# Patient Record
Sex: FEMALE | Race: WHITE | Employment: OTHER | URBAN - NONMETROPOLITAN AREA
[De-identification: names, ages, dates, MRNs, and addresses within clinical notes are randomized per-mention and may not be internally consistent; named-entity substitution may affect disease eponyms.]

---

## 2020-11-13 ENCOUNTER — HOSPITAL ENCOUNTER (INPATIENT)
Age: 60
LOS: 7 days | Discharge: HOME | DRG: 177 | End: 2020-11-20
Attending: EMERGENCY MEDICINE | Admitting: INTERNAL MEDICINE
Payer: COMMERCIAL

## 2020-11-13 ENCOUNTER — APPOINTMENT (OUTPATIENT)
Dept: GENERAL RADIOLOGY | Age: 60
DRG: 177 | End: 2020-11-13
Payer: COMMERCIAL

## 2020-11-13 PROBLEM — U07.1 COVID-19: Status: ACTIVE | Noted: 2020-11-13

## 2020-11-13 LAB
ABO: NORMAL
ALBUMIN SERPL-MCNC: 3.3 G/DL (ref 3.5–5.1)
ALLEN TEST: POSITIVE
ALP BLD-CCNC: 103 U/L (ref 38–126)
ALT SERPL-CCNC: 59 U/L (ref 11–66)
ANION GAP SERPL CALCULATED.3IONS-SCNC: 13 MEQ/L (ref 8–16)
ANION GAP SERPL CALCULATED.3IONS-SCNC: 14 MEQ/L (ref 8–16)
AST SERPL-CCNC: 55 U/L (ref 5–40)
BASE EXCESS (CALCULATED): -1.7 MMOL/L (ref -2.5–2.5)
BASOPHILS # BLD: 0 %
BASOPHILS # BLD: 0.2 %
BASOPHILS ABSOLUTE: 0 THOU/MM3 (ref 0–0.1)
BASOPHILS ABSOLUTE: 0 THOU/MM3 (ref 0–0.1)
BILIRUB SERPL-MCNC: 0.5 MG/DL (ref 0.3–1.2)
BUN BLDV-MCNC: 10 MG/DL (ref 7–22)
BUN BLDV-MCNC: 10 MG/DL (ref 7–22)
CALCIUM SERPL-MCNC: 8.7 MG/DL (ref 8.5–10.5)
CALCIUM SERPL-MCNC: 8.8 MG/DL (ref 8.5–10.5)
CHLORIDE BLD-SCNC: 97 MEQ/L (ref 98–111)
CHLORIDE BLD-SCNC: 97 MEQ/L (ref 98–111)
CO2: 21 MEQ/L (ref 23–33)
CO2: 22 MEQ/L (ref 23–33)
COLLECTED BY:: ABNORMAL
CREAT SERPL-MCNC: 0.7 MG/DL (ref 0.4–1.2)
CREAT SERPL-MCNC: 0.7 MG/DL (ref 0.4–1.2)
D-DIMER QUANTITATIVE: 376 NG/ML FEU (ref 0–500)
DEVICE: ABNORMAL
EKG ATRIAL RATE: 86 BPM
EKG P AXIS: 47 DEGREES
EKG P-R INTERVAL: 134 MS
EKG Q-T INTERVAL: 380 MS
EKG QRS DURATION: 90 MS
EKG QTC CALCULATION (BAZETT): 454 MS
EKG R AXIS: 75 DEGREES
EKG T AXIS: 51 DEGREES
EKG VENTRICULAR RATE: 86 BPM
EOSINOPHIL # BLD: 0 %
EOSINOPHIL # BLD: 0 %
EOSINOPHILS ABSOLUTE: 0 THOU/MM3 (ref 0–0.4)
EOSINOPHILS ABSOLUTE: 0 THOU/MM3 (ref 0–0.4)
ERYTHROCYTE [DISTWIDTH] IN BLOOD BY AUTOMATED COUNT: 13.6 % (ref 11.5–14.5)
ERYTHROCYTE [DISTWIDTH] IN BLOOD BY AUTOMATED COUNT: 13.7 % (ref 11.5–14.5)
ERYTHROCYTE [DISTWIDTH] IN BLOOD BY AUTOMATED COUNT: 43.8 FL (ref 35–45)
ERYTHROCYTE [DISTWIDTH] IN BLOOD BY AUTOMATED COUNT: 47.7 FL (ref 35–45)
FERRITIN: 633 NG/ML (ref 10–291)
FIBRINOGEN: 478 MG/100ML (ref 155–475)
FLU A ANTIGEN: NEGATIVE
FLU B ANTIGEN: NEGATIVE
GFR SERPL CREATININE-BSD FRML MDRD: 85 ML/MIN/1.73M2
GFR SERPL CREATININE-BSD FRML MDRD: 85 ML/MIN/1.73M2
GLUCOSE BLD-MCNC: 223 MG/DL (ref 70–108)
GLUCOSE BLD-MCNC: 256 MG/DL (ref 70–108)
HCO3: 22 MMOL/L (ref 23–28)
HCT VFR BLD CALC: 35.4 % (ref 37–47)
HCT VFR BLD CALC: 38.1 % (ref 37–47)
HEMOGLOBIN: 11.9 GM/DL (ref 12–16)
HEMOGLOBIN: 12 GM/DL (ref 12–16)
IFIO2: 5
IMMATURE GRANS (ABS): 0.02 THOU/MM3 (ref 0–0.07)
IMMATURE GRANS (ABS): 0.02 THOU/MM3 (ref 0–0.07)
IMMATURE GRANULOCYTES: 0.5 %
IMMATURE GRANULOCYTES: 0.5 %
LACTIC ACID, SEPSIS: 1.1 MMOL/L (ref 0.5–1.9)
LD: 262 U/L (ref 100–190)
LYMPHOCYTES # BLD: 12.2 %
LYMPHOCYTES # BLD: 18.9 %
LYMPHOCYTES ABSOLUTE: 0.5 THOU/MM3 (ref 1–4.8)
LYMPHOCYTES ABSOLUTE: 0.7 THOU/MM3 (ref 1–4.8)
MAGNESIUM: 1.8 MG/DL (ref 1.6–2.4)
MCH RBC QN AUTO: 29.5 PG (ref 26–33)
MCH RBC QN AUTO: 29.9 PG (ref 26–33)
MCHC RBC AUTO-ENTMCNC: 31.5 GM/DL (ref 32.2–35.5)
MCHC RBC AUTO-ENTMCNC: 33.6 GM/DL (ref 32.2–35.5)
MCV RBC AUTO: 87.8 FL (ref 81–99)
MCV RBC AUTO: 94.8 FL (ref 81–99)
MONOCYTES # BLD: 3 %
MONOCYTES # BLD: 6.5 %
MONOCYTES ABSOLUTE: 0.1 THOU/MM3 (ref 0.4–1.3)
MONOCYTES ABSOLUTE: 0.2 THOU/MM3 (ref 0.4–1.3)
NUCLEATED RED BLOOD CELLS: 0 /100 WBC
NUCLEATED RED BLOOD CELLS: 0 /100 WBC
O2 SATURATION: 95 %
OSMOLALITY CALCULATION: 270.5 MOSMOL/KG (ref 275–300)
OSMOLALITY CALCULATION: 272.3 MOSMOL/KG (ref 275–300)
PCO2: 32 MMHG (ref 35–45)
PH BLOOD GAS: 7.44 (ref 7.35–7.45)
PLATELET # BLD: 137 THOU/MM3 (ref 130–400)
PLATELET # BLD: 151 THOU/MM3 (ref 130–400)
PMV BLD AUTO: 12 FL (ref 9.4–12.4)
PMV BLD AUTO: 12.1 FL (ref 9.4–12.4)
PO2: 71 MMHG (ref 71–104)
POTASSIUM REFLEX MAGNESIUM: 4.4 MEQ/L (ref 3.5–5.2)
POTASSIUM SERPL-SCNC: 4 MEQ/L (ref 3.5–5.2)
PRO-BNP: 76.6 PG/ML (ref 0–900)
PROCALCITONIN: 0.17 NG/ML (ref 0.01–0.09)
PROCALCITONIN: 0.17 NG/ML (ref 0.01–0.09)
RBC # BLD: 4.02 MILL/MM3 (ref 4.2–5.4)
RBC # BLD: 4.03 MILL/MM3 (ref 4.2–5.4)
RH FACTOR: NORMAL
SARS-COV-2: DETECTED
SEG NEUTROPHILS: 74.1 %
SEG NEUTROPHILS: 84.1 %
SEGMENTED NEUTROPHILS ABSOLUTE COUNT: 2.7 THOU/MM3 (ref 1.8–7.7)
SEGMENTED NEUTROPHILS ABSOLUTE COUNT: 3.4 THOU/MM3 (ref 1.8–7.7)
SODIUM BLD-SCNC: 132 MEQ/L (ref 135–145)
SODIUM BLD-SCNC: 132 MEQ/L (ref 135–145)
SOURCE, BLOOD GAS: ABNORMAL
TOTAL PROTEIN: 6.9 G/DL (ref 6.1–8)
TROPONIN T: < 0.01 NG/ML
TROPONIN T: < 0.01 NG/ML
VITAMIN D 25-HYDROXY: 20 NG/ML (ref 30–100)
WBC # BLD: 3.7 THOU/MM3 (ref 4.8–10.8)
WBC # BLD: 4 THOU/MM3 (ref 4.8–10.8)

## 2020-11-13 PROCEDURE — 84484 ASSAY OF TROPONIN QUANT: CPT

## 2020-11-13 PROCEDURE — 83605 ASSAY OF LACTIC ACID: CPT

## 2020-11-13 PROCEDURE — 85385 FIBRINOGEN ANTIGEN: CPT

## 2020-11-13 PROCEDURE — 99285 EMERGENCY DEPT VISIT HI MDM: CPT

## 2020-11-13 PROCEDURE — 83880 ASSAY OF NATRIURETIC PEPTIDE: CPT

## 2020-11-13 PROCEDURE — 83735 ASSAY OF MAGNESIUM: CPT

## 2020-11-13 PROCEDURE — 94761 N-INVAS EAR/PLS OXIMETRY MLT: CPT

## 2020-11-13 PROCEDURE — P9059 PLASMA, FRZ BETWEEN 8-24HOUR: HCPCS

## 2020-11-13 PROCEDURE — XW13325 TRANSFUSION OF CONVALESCENT PLASMA (NONAUTOLOGOUS) INTO PERIPHERAL VEIN, PERCUTANEOUS APPROACH, NEW TECHNOLOGY GROUP 5: ICD-10-PCS | Performed by: INTERNAL MEDICINE

## 2020-11-13 PROCEDURE — 85025 COMPLETE CBC W/AUTO DIFF WBC: CPT

## 2020-11-13 PROCEDURE — 80053 COMPREHEN METABOLIC PANEL: CPT

## 2020-11-13 PROCEDURE — 71045 X-RAY EXAM CHEST 1 VIEW: CPT

## 2020-11-13 PROCEDURE — 82803 BLOOD GASES ANY COMBINATION: CPT

## 2020-11-13 PROCEDURE — 84145 PROCALCITONIN (PCT): CPT

## 2020-11-13 PROCEDURE — 82728 ASSAY OF FERRITIN: CPT

## 2020-11-13 PROCEDURE — 6360000002 HC RX W HCPCS: Performed by: PHYSICIAN ASSISTANT

## 2020-11-13 PROCEDURE — 2060000000 HC ICU INTERMEDIATE R&B

## 2020-11-13 PROCEDURE — 85379 FIBRIN DEGRADATION QUANT: CPT

## 2020-11-13 PROCEDURE — 87804 INFLUENZA ASSAY W/OPTIC: CPT

## 2020-11-13 PROCEDURE — 36600 WITHDRAWAL OF ARTERIAL BLOOD: CPT

## 2020-11-13 PROCEDURE — 36415 COLL VENOUS BLD VENIPUNCTURE: CPT

## 2020-11-13 PROCEDURE — 2700000000 HC OXYGEN THERAPY PER DAY

## 2020-11-13 PROCEDURE — 6370000000 HC RX 637 (ALT 250 FOR IP): Performed by: INTERNAL MEDICINE

## 2020-11-13 PROCEDURE — 93010 ELECTROCARDIOGRAM REPORT: CPT | Performed by: NUCLEAR MEDICINE

## 2020-11-13 PROCEDURE — 99223 1ST HOSP IP/OBS HIGH 75: CPT | Performed by: INTERNAL MEDICINE

## 2020-11-13 PROCEDURE — XW033E5 INTRODUCTION OF REMDESIVIR ANTI-INFECTIVE INTO PERIPHERAL VEIN, PERCUTANEOUS APPROACH, NEW TECHNOLOGY GROUP 5: ICD-10-PCS | Performed by: INTERNAL MEDICINE

## 2020-11-13 PROCEDURE — 86900 BLOOD TYPING SEROLOGIC ABO: CPT

## 2020-11-13 PROCEDURE — 87040 BLOOD CULTURE FOR BACTERIA: CPT

## 2020-11-13 PROCEDURE — 93005 ELECTROCARDIOGRAM TRACING: CPT | Performed by: PHYSICIAN ASSISTANT

## 2020-11-13 PROCEDURE — 82306 VITAMIN D 25 HYDROXY: CPT

## 2020-11-13 PROCEDURE — 2580000003 HC RX 258: Performed by: PHYSICIAN ASSISTANT

## 2020-11-13 PROCEDURE — 83615 LACTATE (LD) (LDH) ENZYME: CPT

## 2020-11-13 PROCEDURE — 86901 BLOOD TYPING SEROLOGIC RH(D): CPT

## 2020-11-13 RX ORDER — DEXTROSE MONOHYDRATE 25 G/50ML
12.5 INJECTION, SOLUTION INTRAVENOUS PRN
Status: DISCONTINUED | OUTPATIENT
Start: 2020-11-13 | End: 2020-11-20 | Stop reason: HOSPADM

## 2020-11-13 RX ORDER — TOPIRAMATE 100 MG/1
100 TABLET, FILM COATED ORAL 2 TIMES DAILY
Status: DISCONTINUED | OUTPATIENT
Start: 2020-11-13 | End: 2020-11-20 | Stop reason: HOSPADM

## 2020-11-13 RX ORDER — ASPIRIN 81 MG/1
81 TABLET ORAL DAILY
Status: DISCONTINUED | OUTPATIENT
Start: 2020-11-13 | End: 2020-11-13

## 2020-11-13 RX ORDER — GLUCOSAMINE/D3/BOSWELLIA SERRA 1500MG-400
10000 TABLET ORAL DAILY
Status: DISCONTINUED | OUTPATIENT
Start: 2020-11-14 | End: 2020-11-13 | Stop reason: RX

## 2020-11-13 RX ORDER — DEXAMETHASONE 4 MG/1
6 TABLET ORAL DAILY
Status: DISCONTINUED | OUTPATIENT
Start: 2020-11-14 | End: 2020-11-20 | Stop reason: HOSPADM

## 2020-11-13 RX ORDER — ACETAMINOPHEN 650 MG/1
650 SUPPOSITORY RECTAL EVERY 6 HOURS PRN
Status: DISCONTINUED | OUTPATIENT
Start: 2020-11-13 | End: 2020-11-20 | Stop reason: HOSPADM

## 2020-11-13 RX ORDER — NICOTINE POLACRILEX 4 MG
15 LOZENGE BUCCAL PRN
Status: DISCONTINUED | OUTPATIENT
Start: 2020-11-13 | End: 2020-11-20 | Stop reason: HOSPADM

## 2020-11-13 RX ORDER — ALOGLIPTIN 25 MG/1
25 TABLET, FILM COATED ORAL DAILY
Status: DISCONTINUED | OUTPATIENT
Start: 2020-11-13 | End: 2020-11-20 | Stop reason: HOSPADM

## 2020-11-13 RX ORDER — POTASSIUM CHLORIDE 750 MG/1
10 TABLET, FILM COATED, EXTENDED RELEASE ORAL 2 TIMES DAILY
Status: DISCONTINUED | OUTPATIENT
Start: 2020-11-13 | End: 2020-11-20 | Stop reason: HOSPADM

## 2020-11-13 RX ORDER — DEXAMETHASONE SODIUM PHOSPHATE 4 MG/ML
10 INJECTION, SOLUTION INTRA-ARTICULAR; INTRALESIONAL; INTRAMUSCULAR; INTRAVENOUS; SOFT TISSUE ONCE
Status: COMPLETED | OUTPATIENT
Start: 2020-11-13 | End: 2020-11-13

## 2020-11-13 RX ORDER — GLIMEPIRIDE 4 MG/1
4 TABLET ORAL EVERY EVENING
Status: DISCONTINUED | OUTPATIENT
Start: 2020-11-13 | End: 2020-11-13

## 2020-11-13 RX ORDER — LANOLIN ALCOHOL/MO/W.PET/CERES
1000 CREAM (GRAM) TOPICAL DAILY
Status: DISCONTINUED | OUTPATIENT
Start: 2020-11-14 | End: 2020-11-20 | Stop reason: HOSPADM

## 2020-11-13 RX ORDER — RIOCIGUAT 2.5 MG/1
2.5 TABLET, FILM COATED ORAL 3 TIMES DAILY
COMMUNITY

## 2020-11-13 RX ORDER — ROSUVASTATIN CALCIUM 10 MG/1
10 TABLET, COATED ORAL EVERY EVENING
Status: DISCONTINUED | OUTPATIENT
Start: 2020-11-13 | End: 2020-11-20 | Stop reason: HOSPADM

## 2020-11-13 RX ORDER — ACETAMINOPHEN 325 MG/1
650 TABLET ORAL EVERY 6 HOURS PRN
Status: DISCONTINUED | OUTPATIENT
Start: 2020-11-13 | End: 2020-11-20 | Stop reason: HOSPADM

## 2020-11-13 RX ORDER — SPIRONOLACTONE 100 MG/1
200 TABLET, FILM COATED ORAL DAILY
Status: ON HOLD | COMMUNITY
End: 2020-11-20 | Stop reason: HOSPADM

## 2020-11-13 RX ORDER — MINOCYCLINE HYDROCHLORIDE 50 MG/1
50 CAPSULE ORAL 2 TIMES DAILY
Status: DISCONTINUED | OUTPATIENT
Start: 2020-11-13 | End: 2020-11-13

## 2020-11-13 RX ORDER — 0.9 % SODIUM CHLORIDE 0.9 %
30 INTRAVENOUS SOLUTION INTRAVENOUS PRN
Status: DISCONTINUED | OUTPATIENT
Start: 2020-11-13 | End: 2020-11-20 | Stop reason: HOSPADM

## 2020-11-13 RX ORDER — TORSEMIDE 100 MG/1
200 TABLET ORAL DAILY
Status: ON HOLD | COMMUNITY
End: 2020-11-20 | Stop reason: HOSPADM

## 2020-11-13 RX ORDER — POTASSIUM CHLORIDE 750 MG/1
10 CAPSULE, EXTENDED RELEASE ORAL 2 TIMES DAILY
Status: ON HOLD | COMMUNITY
End: 2020-11-20 | Stop reason: HOSPADM

## 2020-11-13 RX ORDER — ZINC SULFATE 50(220)MG
50 CAPSULE ORAL DAILY
Status: DISCONTINUED | OUTPATIENT
Start: 2020-11-13 | End: 2020-11-20 | Stop reason: HOSPADM

## 2020-11-13 RX ORDER — NITROGLYCERIN 0.4 MG/1
0.4 TABLET SUBLINGUAL EVERY 5 MIN PRN
Status: DISCONTINUED | OUTPATIENT
Start: 2020-11-13 | End: 2020-11-13

## 2020-11-13 RX ORDER — DEXTROSE MONOHYDRATE 50 MG/ML
100 INJECTION, SOLUTION INTRAVENOUS PRN
Status: DISCONTINUED | OUTPATIENT
Start: 2020-11-13 | End: 2020-11-20 | Stop reason: HOSPADM

## 2020-11-13 RX ORDER — ESCITALOPRAM OXALATE 10 MG/1
10 TABLET ORAL DAILY
Status: DISCONTINUED | OUTPATIENT
Start: 2020-11-13 | End: 2020-11-20 | Stop reason: HOSPADM

## 2020-11-13 RX ORDER — TOPIRAMATE 100 MG/1
100 TABLET, FILM COATED ORAL 2 TIMES DAILY
COMMUNITY

## 2020-11-13 RX ORDER — SPIRONOLACTONE 25 MG/1
200 TABLET ORAL DAILY
Status: DISCONTINUED | OUTPATIENT
Start: 2020-11-14 | End: 2020-11-14

## 2020-11-13 RX ORDER — PIOGLITAZONEHYDROCHLORIDE 30 MG/1
30 TABLET ORAL EVERY EVENING
Status: DISCONTINUED | OUTPATIENT
Start: 2020-11-13 | End: 2020-11-13

## 2020-11-13 RX ORDER — SODIUM CHLORIDE 9 MG/ML
INJECTION, SOLUTION INTRAVENOUS CONTINUOUS
Status: DISCONTINUED | OUTPATIENT
Start: 2020-11-13 | End: 2020-11-13

## 2020-11-13 RX ORDER — TORSEMIDE 20 MG/1
200 TABLET ORAL DAILY
Status: DISCONTINUED | OUTPATIENT
Start: 2020-11-14 | End: 2020-11-14

## 2020-11-13 RX ORDER — 0.9 % SODIUM CHLORIDE 0.9 %
20 INTRAVENOUS SOLUTION INTRAVENOUS ONCE
Status: DISCONTINUED | OUTPATIENT
Start: 2020-11-13 | End: 2020-11-20 | Stop reason: HOSPADM

## 2020-11-13 RX ORDER — FAMOTIDINE 20 MG/1
20 TABLET, FILM COATED ORAL 2 TIMES DAILY
Status: DISCONTINUED | OUTPATIENT
Start: 2020-11-13 | End: 2020-11-20 | Stop reason: HOSPADM

## 2020-11-13 RX ORDER — GLUCOSAMINE/D3/BOSWELLIA SERRA 1500MG-400
10000 TABLET ORAL
Status: ON HOLD | COMMUNITY
End: 2020-11-20 | Stop reason: HOSPADM

## 2020-11-13 RX ORDER — CETIRIZINE HYDROCHLORIDE 10 MG/1
10 TABLET ORAL DAILY
Status: DISCONTINUED | OUTPATIENT
Start: 2020-11-13 | End: 2020-11-20 | Stop reason: HOSPADM

## 2020-11-13 RX ORDER — LANOLIN ALCOHOL/MO/W.PET/CERES
1000 CREAM (GRAM) TOPICAL DAILY
COMMUNITY

## 2020-11-13 RX ORDER — FAMOTIDINE 20 MG/1
20 TABLET, FILM COATED ORAL 2 TIMES DAILY
COMMUNITY

## 2020-11-13 RX ORDER — VITAMIN B COMPLEX
2000 TABLET ORAL DAILY
Status: DISCONTINUED | OUTPATIENT
Start: 2020-11-13 | End: 2020-11-20 | Stop reason: HOSPADM

## 2020-11-13 RX ORDER — FENOFIBRATE 54 MG/1
54 TABLET ORAL DAILY
Status: DISCONTINUED | OUTPATIENT
Start: 2020-11-13 | End: 2020-11-13

## 2020-11-13 RX ADMIN — Medication 2000 UNITS: at 20:28

## 2020-11-13 RX ADMIN — Medication 50 MG: at 20:28

## 2020-11-13 RX ADMIN — SODIUM CHLORIDE: 9 INJECTION, SOLUTION INTRAVENOUS at 15:28

## 2020-11-13 RX ADMIN — DEXAMETHASONE SODIUM PHOSPHATE 10 MG: 4 INJECTION, SOLUTION INTRA-ARTICULAR; INTRALESIONAL; INTRAMUSCULAR; INTRAVENOUS; SOFT TISSUE at 15:26

## 2020-11-13 ASSESSMENT — ENCOUNTER SYMPTOMS
SORE THROAT: 0
DIARRHEA: 1
SHORTNESS OF BREATH: 1
EYE PAIN: 0
RHINORRHEA: 0
BACK PAIN: 0
NAUSEA: 0
ABDOMINAL PAIN: 0
EYE ITCHING: 0
EYE DISCHARGE: 0
VOMITING: 0
COUGH: 1
WHEEZING: 0
COLOR CHANGE: 0

## 2020-11-13 NOTE — ED TRIAGE NOTES
Pt to ER for evaluation of SOB, cough, NVD for a week. Pt states she has tested positive today for COVID 19. Pt reports chronic home O2 use at 4 LPM. Pt alert and oriented, resp easy and unlabored. Call light within reach. EKG complete.

## 2020-11-13 NOTE — ED NOTES
Spoke with Dr. Cecil Barnett in regards to updates to pt medication list. Also notified pharmacist that Dr. Cecil Barnett will review again.       Katerina Ervin RN  11/13/20 7081

## 2020-11-13 NOTE — ED PROVIDER NOTES
[facility] COMPLAINT       Chief Complaint   Patient presents with    Shortness of Breath    Nausea & Vomiting    Diarrhea    Knee Pain       Nurses Notes reviewed and I agree except as notedin the HPI. HISTORY OF PRESENT ILLNESS    Stacie Chavis is a 61 y.o. female who presents has been ill since last Saturday. The patient has a history of pulmonary hypertension as well as shortness of breath. Patient is also had a cough. The patient is on 5 L per her concentrator at home. She has had some diarrhea. She tested positive for Covid Thursday. She is also have a sister that tested positive. She denies any nausea or vomiting. . Reportedly at home with ambulation her pulse ox is dropping down to 83% on 5 L. .      Location/Symptom: SOB  Timing/Onset: last Saturday  Context/Setting: home  Quality: none  Duration: constant  Modifying Factors: none  Severity: none    REVIEW OF SYSTEMS     Review of Systems   Constitutional: Negative for activity change, appetite change, chills and fever. HENT: Negative for congestion, ear pain, rhinorrhea and sore throat. Eyes: Negative for pain, discharge and itching. Respiratory: Positive for cough and shortness of breath. Negative for wheezing. Cardiovascular: Negative for chest pain. Gastrointestinal: Positive for diarrhea. Negative for abdominal pain, nausea and vomiting. Genitourinary: Negative for difficulty urinating and dysuria. Musculoskeletal: Negative for arthralgias, back pain and myalgias. Skin: Negative for color change and rash. Neurological: Negative for dizziness, seizures, light-headedness and headaches. Psychiatric/Behavioral: Negative for agitation, confusion, self-injury and suicidal ideas. All other systems reviewed and are negative.        PAST MEDICAL HISTORY    has a past medical history of Type II or unspecified type diabetes mellitus without mention of complication, not stated as uncontrolled. SURGICAL HISTORY      has a past surgical history that includes Hysterectomy; back surgery (); Lumbar disc surgery (); and Tonsillectomy. CURRENT MEDICATIONS       Previous Medications    APIXABAN (ELIQUIS) 5 MG TABS TABLET    Take 5 mg by mouth 2 times daily    BIOTIN 75297 MCG TABS    Take 10,000 mcg by mouth    CARIPRAZINE HCL (VRAYLAR) 1.5 MG CAPSULE    Take 1.5 mg by mouth daily    CETIRIZINE (ZYRTEC ALLERGY) 10 MG TABLET    Take 10 mg by mouth daily    ESCITALOPRAM (LEXAPRO) 10 MG TABLET    Take 10 mg by mouth daily    FAMOTIDINE (PEPCID) 20 MG TABLET    Take 20 mg by mouth 2 times daily    POTASSIUM CHLORIDE (MICRO-K) 10 MEQ EXTENDED RELEASE CAPSULE    Take 10 mEq by mouth 2 times daily    RIOCIGUAT (ADEMPAS) 2.5 MG TABS    Take 2.5 mg by mouth 3 times daily    ROSUVASTATIN (CRESTOR) 10 MG TABLET      Take 10 mg by mouth every evening     SITAGLIPTIN (JANUVIA) 100 MG TABLET      Take 100 mg by mouth every evening     SPIRONOLACTONE (ALDACTONE) 100 MG TABLET    Take 200 mg by mouth daily    TOPIRAMATE (TOPAMAX) 100 MG TABLET    Take 100 mg by mouth 2 times daily    TORSEMIDE (DEMADEX) 100 MG TABLET    Take 200 mg by mouth daily    VITAMIN B-12 (CYANOCOBALAMIN) 1000 MCG TABLET    Take 1,000 mcg by mouth daily       ALLERGIES     is allergic to latex. HISTORY     She indicated that her mother is alive. She indicated that her father is . She indicated that her sister is alive. She indicated that her other is alive. family history includes Arthritis in her sister; Diabetes in her father and mother; Heart Disease in her father; Other in her father, mother, and another family member. SOCIALHISTORY      reports that she has been smoking. She has a 30.00 pack-year smoking history. She does not have any smokeless tobacco history on file. She reports current alcohol use of about 2.0 standard drinks of alcohol per week.     PHYSICAL EXAM     INITIAL VITALS:  weight is 260 lb (117.9 kg). Her oral temperature is 99.4 °F (37.4 °C). Her blood pressure is 130/50 (abnormal) and her pulse is 84. Her respiration is 30 and oxygen saturation is 95%. Physical Exam  Vitals signs and nursing note reviewed. Constitutional:       Comments: Well Developed Well Nourished Appearing     HENT:      Head: Normocephalic and atraumatic. Eyes:      Pupils: Pupils are equal, round, and reactive to light. Neck:      Musculoskeletal: Normal range of motion and neck supple. Cardiovascular:      Rate and Rhythm: Normal rate and regular rhythm. Heart sounds: Normal heart sounds. Pulmonary:      Effort: Tachypnea present. Breath sounds: No wheezing. Abdominal:      General: Bowel sounds are normal. There is no distension. Palpations: Abdomen is soft. DIFFERENTIAL DIAGNOSIS:   Tachypnea. Possible COVID-19. Possible pneumonia. Hypoxemia requiring more oxygen than at home. DIAGNOSTIC RESULTS     EKG: All EKG's are interpreted by the Emergency Department Physician who either signs or Co-signs this chart in the absence of a cardiologist.      RADIOLOGY: non-plain film images(s) such as CT, Ultrasound and MRI are read by the radiologist.  Single view x-ray read per radiology     XR CHEST PORTABLE (Final result)   Result time 11/13/20 12:55:05   Final result by Jese Taylor MD (11/13/20 12:55:05)                 Impression:     1. Normal heart size. No effusion is seen. 2. Moderate groundglass infiltrates both mid and lower lung fields, consistent with pneumonia versus pulmonary edema. **This report has been created using voice recognition software.  It may contain minor errors which are inherent in voice recognition technology. **     Final report electronically signed by Dr. Jese Taylor on 11/13/2020 12:55 PM             Narrative:     PROCEDURE: XR CHEST PORTABLE     CLINICAL INFORMATION: Cough SOB     COMPARISON: No prior study.      TECHNIQUE: A single mobile view of the chest was obtained. LABS:   Labs Reviewed   CBC WITH AUTO DIFFERENTIAL - Abnormal; Notable for the following components:       Result Value    WBC 3.7 (*)     RBC 4.03 (*)     Hemoglobin 11.9 (*)     Hematocrit 35.4 (*)     Lymphocytes Absolute 0.7 (*)     Monocytes Absolute 0.2 (*)     All other components within normal limits   BASIC METABOLIC PANEL - Abnormal; Notable for the following components:    Sodium 132 (*)     Chloride 97 (*)     CO2 22 (*)     Glucose 223 (*)     All other components within normal limits   PROCALCITONIN - Abnormal; Notable for the following components:    Procalcitonin 0.17 (*)     All other components within normal limits   BLOOD GAS, ARTERIAL - Abnormal; Notable for the following components:    PCO2 32 (*)     HCO3 22 (*)     All other components within normal limits   OSMOLALITY - Abnormal; Notable for the following components:    Osmolality Calc 270.5 (*)     All other components within normal limits   GLOMERULAR FILTRATION RATE, ESTIMATED - Abnormal; Notable for the following components:    Est, Glom Filt Rate 85 (*)     All other components within normal limits   RAPID INFLUENZA A/B ANTIGENS   CULTURE, BLOOD 1   CULTURE, BLOOD 2   TROPONIN   MAGNESIUM   BRAIN NATRIURETIC PEPTIDE   LACTATE, SEPSIS   ANION GAP   CBC WITH AUTO DIFFERENTIAL   COMPREHENSIVE METABOLIC PANEL W/ REFLEX TO MG FOR LOW K   FIBRINOGEN   PROCALCITONIN   LACTATE DEHYDROGENASE   FERRITIN   D-DIMER, QUANTITATIVE   TROPONIN   VITAMIN D 25 HYDROXY   PREPARE COVID-19 CONVALESCENT PLASMA   ABO/RH       EMERGENCY DEPARTMENT COURSE:   :    Vitals:    11/13/20 1416 11/13/20 1517 11/13/20 1620 11/13/20 1639   BP: 119/62 (!) 128/46  (!) 130/50   Pulse: 79 78  84   Resp: 30 28  30   Temp:       TempSrc:       SpO2: 97% 98% 95% 95%   Weight:         Patient was seen history physical exam was performed. The patient was given Decadron IV.   Patient is hypoxic will admit for further care and management. Patient was started on high flow oxygen. Case was discussed with Dr. Gem Peters. See disposition below    CRITICAL CARE:  None    CONSULTS:  Dr. Al Piute:  None    FINAL IMPRESSION      1. COVID-19    2. Hypoxemia          DISPOSITION/PLAN   Admit    PATIENT REFERRED TO:  No follow-up provider specified.     DISCHARGE MEDICATIONS:  New Prescriptions    No medications on file       (Please note that portions of this note were completed with a voice recognitionprogram.  Efforts were made to edit the dictations but occasionally words are mis-transcribed.)    MAME [place]as [place]  11/13/20 8095

## 2020-11-13 NOTE — H&P
History & Physical        Patient:  Suleman Reed  YOB: 1960    MRN: [id number]     Acct: [de-identified]    PCP: Shivam Tejada DO    Date of Admission: 11/13/2020    Date of Service: Pt seen/examined on 11/13/20  and Admitted to Inpatient with expected LOS greater than two midnights due to medical therapy. Chief Complaint:  Sob, diarrhea, fever, positive covid      History Of Present Illness:    61 y.o. female who presented to [address] [place] [address] with above sxs for the past 6 days. She has a hx of pulmonary hypertension related to chronic pulmonary emboli and is on pulmonary vasodilators. She has been ill for the past 6 days and came to ER. She is on 5 liters of 02 at home and desatted on that. Therefore she is being admitted. She is a diabetic. She denies heart problems. She is an exsmoker. Past Medical History:          Diagnosis Date    Type II or unspecified type diabetes mellitus without mention of complication, not stated as uncontrolled        Past Surgical History:          Procedure Laterality Date    BACK SURGERY  1980    spine is fused due to scoliosis, since 1980 has had 4 other surgeries.  HYSTERECTOMY      LUMBAR DISC SURGERY  1990    TONSILLECTOMY      at [de-identified] years old       Medications Prior to Admission:      Prior to Admission medications    Medication Sig Start Date End Date Taking?  Authorizing Provider   glimepiride (AMARYL) 4 MG tablet   Take 4 mg by mouth every evening     Historical Provider, MD   rosuvastatin (CRESTOR) 10 MG tablet   Take 10 mg by mouth every evening     Historical Provider, MD   sitaGLIPtin (JANUVIA) 100 MG tablet   Take 100 mg by mouth every evening     Historical Provider, MD   pioglitazone (ACTOS) 30 MG tablet   Take 30 mg by mouth every evening     Historical Provider, MD   escitalopram (LEXAPRO) 10 MG tablet Take 10 mg by mouth daily    Historical Provider, MD   minocycline (MINOCIN;DYNACIN) 50 MG capsule Take 50 mg by mouth 2 times daily    Historical Provider, MD   estradiol (CLIMARA) 0.1 MG/24HR Place 1 patch onto the skin once a week    Historical Provider, MD   dapagliflozin (FARXIGA) 5 MG tablet   Take 5 mg by mouth every evening     Historical Provider, MD   cetirizine (ZYRTEC ALLERGY) 10 MG tablet Take 10 mg by mouth daily    Historical Provider, MD   choline fenofibrate (TRILIPIX) 135 MG CPDR   Take 135 mg by mouth every evening     Historical Provider, MD   aspirin EC 81 MG EC tablet Take 1 tablet by mouth daily 8/5/15   Gretchen Schmitt MD   metoprolol (LOPRESSOR) 25 MG tablet Take 0.5 tablets by mouth 2 times daily 8/5/15   Gretchen Schmitt MD   nitroGLYCERIN (NITROSTAT) 0.4 MG SL tablet Place 1 tablet under the tongue every 5 minutes as needed for Chest pain 8/5/15   Gretchen Schmitt MD       Allergies:  Latex    Social History:      The patient currently lives with     TOBACCO:   reports that she has been smoking. She has a 30.00 pack-year smoking history. She does not have any smokeless tobacco history on file. ETOH:   reports current alcohol use of about 2.0 standard drinks of alcohol per week. Family History:       Positive as follows:        Problem Relation Age of Onset    Diabetes Mother     Other Mother     Heart Disease Father     Diabetes Father     Other Father     Arthritis Sister     Other Other        Diet:  No diet orders on file    REVIEW OF SYSTEMS:   Pertinent positives as noted in the HPI. All other systems reviewed and negative. PHYSICAL EXAM:    BP (!) 128/46   Pulse 78   Temp 99.4 °F (37.4 °C) (Oral)   Resp 28 Comment: Simultaneous filing. User may not have seen previous data. Wt 260 lb (117.9 kg)   SpO2 98% Comment: Simultaneous filing. User may not have seen previous data. BMI 44.63 kg/m²     General appearance:  No apparent distress, appears stated age and cooperative. Obese    HEENT:  Normal cephalic, atraumatic without obvious deformity.  Pupils equal, round, and reactive to light. Extra ocular muscles intact. Conjunctivae/corneas clear. Neck: Supple, with full range of motion. No jugular venous distention. Trachea midline. Respiratory:  Normal respiratory effort. Breath sounds diminished bilaterally. Cardiovascular:  Regular rate and rhythm with normal S1/S2 without murmurs, rubs or gallops. Abdomen: Soft, non-tender, non-distended with normal bowel sounds. Musculoskeletal:  No clubbing, cyanosis  bilaterally. Full range of motion without deformity. Legs puffy. Skin: Skin color, texture, turgor normal.  No rashes or lesions. Neurologic:  Neurovascularly intact without any focal sensory/motor deficits. Cranial nerves: II-XII intact, grossly non-focal.  Psychiatric:  Alert and oriented, thought content appropriate, normal insight    Labs:     Recent Labs     11/13/20  1308   WBC 3.7*   HGB 11.9*   HCT 35.4*        Recent Labs     11/13/20  1308   *   K 4.0   CL 97*   CO2 22*   BUN 10   CREATININE 0.7   CALCIUM 8.8     No results for input(s): AST, ALT, BILIDIR, BILITOT, ALKPHOS in the last 72 hours. No results for input(s): INR in the last 72 hours. No results for input(s): [place] in the last 72 hours. Urinalysis:    No results found for: NITRU, WBCUA, BACTERIA, RBCUA, BLOODU, SPECGRAV, GLUCOSEU    Intake & Output:  No intake/output data recorded. No intake/output data recorded. Radiology:     CXR: I have reviewed the CXR with the following interpretation: bilateral densities  EKG:  I have reviewed the EKG with the following interpretation: na    XR CHEST PORTABLE   Final Result   1. Normal heart size. No effusion is seen. 2. Moderate groundglass infiltrates both mid and lower lung fields, consistent with pneumonia versus pulmonary edema. **This report has been created using voice recognition software. It may contain minor errors which are inherent in voice recognition technology. **      Final report electronically signed by Dr. Madina Gibbons on 11/13/2020 12:55 PM           DVT prophylaxis: {dvt prophylaxis:[id number]    Code Status: No Order        Ta Liz    Diagnosis Date Noted    COVID-19 [U07.1] 11/13/2020       PLAN:    1. covid 19- discussed plasma, decadron, remdesivir- she is agreeable to all  2. CTEPH- continue home rx  3. DM- monitor- sliding scale        Thank you Claudia Hodges DO for the opportunity to be involved in this patient's care.     Electronically signed by Francisco Klinefelter, MD on 11/13/2020 at 4:02 PM

## 2020-11-13 NOTE — ED NOTES
Pt ambulated down the hallway with pulse Oximeter. Pt SpO2 remained at 90% on pulsed oxygen setting. Pt SpO2 dropped down to 88% with exertion, while was getting in bed.       Alesia Lucas RN  11/13/20 3438

## 2020-11-14 LAB
ALBUMIN SERPL-MCNC: 3.5 G/DL (ref 3.5–5.1)
ALP BLD-CCNC: 104 U/L (ref 38–126)
ALT SERPL-CCNC: 52 U/L (ref 11–66)
ANION GAP SERPL CALCULATED.3IONS-SCNC: 9 MEQ/L (ref 8–16)
AST SERPL-CCNC: 40 U/L (ref 5–40)
BASOPHILS # BLD: 0 %
BASOPHILS ABSOLUTE: 0 THOU/MM3 (ref 0–0.1)
BILIRUB SERPL-MCNC: 0.4 MG/DL (ref 0.3–1.2)
BUN BLDV-MCNC: 11 MG/DL (ref 7–22)
C-REACTIVE PROTEIN: 3.29 MG/DL (ref 0–1)
CALCIUM SERPL-MCNC: 8.7 MG/DL (ref 8.5–10.5)
CHLORIDE BLD-SCNC: 97 MEQ/L (ref 98–111)
CO2: 22 MEQ/L (ref 23–33)
CREAT SERPL-MCNC: 0.6 MG/DL (ref 0.4–1.2)
D-DIMER QUANTITATIVE: 379 NG/ML FEU (ref 0–500)
EOSINOPHIL # BLD: 0 %
EOSINOPHILS ABSOLUTE: 0 THOU/MM3 (ref 0–0.4)
ERYTHROCYTE [DISTWIDTH] IN BLOOD BY AUTOMATED COUNT: 13.3 % (ref 11.5–14.5)
ERYTHROCYTE [DISTWIDTH] IN BLOOD BY AUTOMATED COUNT: 42.1 FL (ref 35–45)
FIBRINOGEN: 504 MG/100ML (ref 155–475)
GFR SERPL CREATININE-BSD FRML MDRD: > 90 ML/MIN/1.73M2
GLUCOSE BLD-MCNC: 338 MG/DL (ref 70–108)
HCT VFR BLD CALC: 34.3 % (ref 37–47)
HEMOGLOBIN: 11.6 GM/DL (ref 12–16)
IMMATURE GRANS (ABS): 0.01 THOU/MM3 (ref 0–0.07)
IMMATURE GRANULOCYTES: 0.3 %
LYMPHOCYTES # BLD: 19.2 %
LYMPHOCYTES ABSOLUTE: 0.6 THOU/MM3 (ref 1–4.8)
MCH RBC QN AUTO: 29.1 PG (ref 26–33)
MCHC RBC AUTO-ENTMCNC: 33.8 GM/DL (ref 32.2–35.5)
MCV RBC AUTO: 86.2 FL (ref 81–99)
MONOCYTES # BLD: 7.4 %
MONOCYTES ABSOLUTE: 0.2 THOU/MM3 (ref 0.4–1.3)
NUCLEATED RED BLOOD CELLS: 0 /100 WBC
OSMOLALITY CALCULATION: 269.8 MOSMOL/KG (ref 275–300)
OSMOLALITY URINE: 290 MOSMOL/KG (ref 250–750)
PLATELET # BLD: 141 THOU/MM3 (ref 130–400)
PMV BLD AUTO: 11.8 FL (ref 9.4–12.4)
POTASSIUM REFLEX MAGNESIUM: 4.4 MEQ/L (ref 3.5–5.2)
RBC # BLD: 3.98 MILL/MM3 (ref 4.2–5.4)
SEG NEUTROPHILS: 73.1 %
SEGMENTED NEUTROPHILS ABSOLUTE COUNT: 2.2 THOU/MM3 (ref 1.8–7.7)
SODIUM BLD-SCNC: 127 MEQ/L (ref 135–145)
SODIUM BLD-SCNC: 128 MEQ/L (ref 135–145)
SODIUM URINE: 48 MEQ/L
TOTAL PROTEIN: 6.5 G/DL (ref 6.1–8)
WBC # BLD: 3 THOU/MM3 (ref 4.8–10.8)

## 2020-11-14 PROCEDURE — 2500000003 HC RX 250 WO HCPCS: Performed by: INTERNAL MEDICINE

## 2020-11-14 PROCEDURE — 84300 ASSAY OF URINE SODIUM: CPT

## 2020-11-14 PROCEDURE — 85385 FIBRINOGEN ANTIGEN: CPT

## 2020-11-14 PROCEDURE — 94761 N-INVAS EAR/PLS OXIMETRY MLT: CPT

## 2020-11-14 PROCEDURE — 84295 ASSAY OF SERUM SODIUM: CPT

## 2020-11-14 PROCEDURE — 36430 TRANSFUSION BLD/BLD COMPNT: CPT

## 2020-11-14 PROCEDURE — 2580000003 HC RX 258: Performed by: INTERNAL MEDICINE

## 2020-11-14 PROCEDURE — 99232 SBSQ HOSP IP/OBS MODERATE 35: CPT | Performed by: NURSE PRACTITIONER

## 2020-11-14 PROCEDURE — 86140 C-REACTIVE PROTEIN: CPT

## 2020-11-14 PROCEDURE — 6360000002 HC RX W HCPCS: Performed by: INTERNAL MEDICINE

## 2020-11-14 PROCEDURE — 2580000003 HC RX 258: Performed by: NURSE PRACTITIONER

## 2020-11-14 PROCEDURE — 2060000000 HC ICU INTERMEDIATE R&B

## 2020-11-14 PROCEDURE — 83935 ASSAY OF URINE OSMOLALITY: CPT

## 2020-11-14 PROCEDURE — 6360000002 HC RX W HCPCS: Performed by: NURSE PRACTITIONER

## 2020-11-14 PROCEDURE — 6370000000 HC RX 637 (ALT 250 FOR IP): Performed by: INTERNAL MEDICINE

## 2020-11-14 PROCEDURE — 85379 FIBRIN DEGRADATION QUANT: CPT

## 2020-11-14 PROCEDURE — 85025 COMPLETE CBC W/AUTO DIFF WBC: CPT

## 2020-11-14 PROCEDURE — 6370000000 HC RX 637 (ALT 250 FOR IP): Performed by: NURSE PRACTITIONER

## 2020-11-14 PROCEDURE — 80053 COMPREHEN METABOLIC PANEL: CPT

## 2020-11-14 PROCEDURE — 36415 COLL VENOUS BLD VENIPUNCTURE: CPT

## 2020-11-14 RX ORDER — ONDANSETRON 2 MG/ML
4 INJECTION INTRAMUSCULAR; INTRAVENOUS EVERY 6 HOURS PRN
Status: DISCONTINUED | OUTPATIENT
Start: 2020-11-14 | End: 2020-11-20 | Stop reason: HOSPADM

## 2020-11-14 RX ORDER — LOPERAMIDE HYDROCHLORIDE 2 MG/1
2 CAPSULE ORAL 4 TIMES DAILY PRN
Status: DISCONTINUED | OUTPATIENT
Start: 2020-11-14 | End: 2020-11-20 | Stop reason: HOSPADM

## 2020-11-14 RX ORDER — SODIUM CHLORIDE 9 MG/ML
INJECTION, SOLUTION INTRAVENOUS CONTINUOUS
Status: DISCONTINUED | OUTPATIENT
Start: 2020-11-14 | End: 2020-11-20 | Stop reason: HOSPADM

## 2020-11-14 RX ADMIN — CETIRIZINE HYDROCHLORIDE 10 MG: 10 TABLET, FILM COATED ORAL at 08:43

## 2020-11-14 RX ADMIN — APIXABAN 5 MG: 5 TABLET, FILM COATED ORAL at 20:17

## 2020-11-14 RX ADMIN — Medication 1000 MCG: at 08:45

## 2020-11-14 RX ADMIN — Medication 2000 UNITS: at 08:45

## 2020-11-14 RX ADMIN — POTASSIUM CHLORIDE 10 MEQ: 750 TABLET, FILM COATED, EXTENDED RELEASE ORAL at 20:17

## 2020-11-14 RX ADMIN — TOPIRAMATE 100 MG: 100 TABLET, FILM COATED ORAL at 20:17

## 2020-11-14 RX ADMIN — TOPIRAMATE 100 MG: 100 TABLET, FILM COATED ORAL at 08:45

## 2020-11-14 RX ADMIN — POTASSIUM CHLORIDE 10 MEQ: 750 TABLET, FILM COATED, EXTENDED RELEASE ORAL at 08:43

## 2020-11-14 RX ADMIN — FAMOTIDINE 20 MG: 20 TABLET, FILM COATED ORAL at 08:42

## 2020-11-14 RX ADMIN — REMDESIVIR 200 MG: 100 INJECTION, POWDER, LYOPHILIZED, FOR SOLUTION INTRAVENOUS at 00:57

## 2020-11-14 RX ADMIN — SODIUM CHLORIDE: 9 INJECTION, SOLUTION INTRAVENOUS at 17:40

## 2020-11-14 RX ADMIN — DEXAMETHASONE 6 MG: 4 TABLET ORAL at 08:46

## 2020-11-14 RX ADMIN — ESCITALOPRAM 10 MG: 10 TABLET, FILM COATED ORAL at 08:43

## 2020-11-14 RX ADMIN — ONDANSETRON 4 MG: 2 INJECTION INTRAMUSCULAR; INTRAVENOUS at 15:50

## 2020-11-14 RX ADMIN — ENOXAPARIN SODIUM 120 MG: 120 INJECTION SUBCUTANEOUS at 08:42

## 2020-11-14 RX ADMIN — TORSEMIDE 200 MG: 20 TABLET ORAL at 08:43

## 2020-11-14 RX ADMIN — Medication 50 MG: at 08:46

## 2020-11-14 RX ADMIN — ALOGLIPTIN 25 MG: 25 TABLET, FILM COATED ORAL at 08:43

## 2020-11-14 RX ADMIN — FAMOTIDINE 20 MG: 20 TABLET, FILM COATED ORAL at 20:17

## 2020-11-14 RX ADMIN — SPIRONOLACTONE 200 MG: 25 TABLET ORAL at 08:44

## 2020-11-14 RX ADMIN — CARIPRAZINE 1.5 MG: 1.5 CAPSULE, GELATIN COATED ORAL at 07:00

## 2020-11-14 RX ADMIN — LOPERAMIDE HYDROCHLORIDE 2 MG: 2 CAPSULE ORAL at 15:50

## 2020-11-14 RX ADMIN — ROSUVASTATIN CALCIUM 10 MG: 10 TABLET, FILM COATED ORAL at 20:17

## 2020-11-14 NOTE — ED NOTES
Upon first contact with patient this RN receives bedside shift report GODFREY Cottrell. Pt resting on side of bed and is eating dinner at this time.         Mary Hinton RN  11/13/20 8615

## 2020-11-14 NOTE — PROGRESS NOTES
Remdesivir Initiation Note    Maggy Sawyer meets criteria for initiation of remdesivir under the emergency use authorization (EUA) based on the following:  Known or suspected COVID-19  Severe disease (SpO2 ? 94% on RA, requiring supplemental O2, or requiring invasive mechanical ventilation) - on 60 L/min O2  Acceptable renal function  CrCl ? 30 ml/min based on SCr obtained prior to initiation OR   CrCl < 30 ml/min but the potential benefit of remdesivir outweighs the risk. CrCl estimated to be > 100  Acceptable hepatic function (ALT within 5 times ULN). AST 55    Liver function tests will be monitored daily while on remdesivir.     Jyoti Landis RP, ELDERS, BCGP  11/13/2020     11:07 PM'

## 2020-11-14 NOTE — ED NOTES
Patient medicated per provider order. Pt repositioned in bed. Pt vital signs stable at this time. Pt updated on POC.       [place] RN  11/13/20 2031

## 2020-11-14 NOTE — PROGRESS NOTES
Hospitalist Progress Note    Patient:  Suzan Mcadams      Unit/Bed:6A-08/008-A    YOB: 1960    MRN: [id number]       Acct: [de-identified]     PCP: Tenzin Constantino DO    Date of Admission: 11/13/2020    Assessment/Plan:    1. COVID-19: Was diagnosed on Thursday with COVID-19, however has been feeling ill for last week. History of pulmonary hypertension and wears 5 liters of oxygen at home. Became hypoxic at 83% on 5 liters with ambulation at home. Treating with Vitamin C, Vitamin D, Zinc, Decadron, Remdesivir. Stop Lovenox as patient takes Eliquis at home - restart. Convalescent Plasma ordered yesterday, has not yet received. May need another dose tomorrow due to patient's weight. Will be getting this afternoon. Wean oxygen as able, currently on HFNC. Procalcitonin 0.17, antibiotics not indicated at this time. 2. Acute on chronic hypoxic respiratory failure: Attributed to #1. Wears 5 liters at home chronically. Currently up to 60 liters via HFNC, 70% FiO2. Wean as able. 3. Hyponatremia: Hold Demadex and Aldactone. Check urine sodium and os. Gentle IVF hydration. Serial Na+ checks. 4. CTEPH: Continue home medications. 5.   DMII: Continue coverage via Insulin pump. Expected discharge date:  ? Disposition:    [x] Home       [] TCU       [] Rehab       [] Psych       [] SNF       [] [place]       [] Other-    Chief Complaint: Shortness of breath, diarrhea, fever    Hospital Course: 61 y.o. female who presented to [facility] with above sxs for the past 6 days. She has a hx of pulmonary hypertension related to chronic pulmonary emboli and is on pulmonary vasodilators. She has been ill for the past 6 days and came to ER. She is on 5 liters of 02 at home and desatted on that. Therefore she is being admitted.     She is a diabetic. She denies heart problems. She is an exsmoker. Subjective (past 24 hours):  She is frustrated that sounds. Musculoskeletal: passive and active ROM x 4 extremities. Skin: Skin color, texture, turgor normal.    Neurologic:  Neurovascularly intact without any focal sensory/motor deficits. Cranial nerves: II-XII intact, grossly non-focal.  Psychiatric: Alert and oriented, thought content appropriate  Capillary Refill: Brisk,< 3 seconds   Peripheral Pulses: +2 palpable, equal bilaterally       Labs:   Recent Labs     11/13/20  1308 11/13/20  1706 11/14/20  0601   WBC 3.7* 4.0* 3.0*   HGB 11.9* 12.0 11.6*   HCT 35.4* 38.1 34.3*    137 141     Recent Labs     11/13/20  1308 11/13/20  1710 11/14/20  0601   * 132* 128*   K 4.0 4.4 4.4   CL 97* 97* 97*   CO2 22* 21* 22*   BUN 10 10 11   CREATININE 0.7 0.7 0.6   CALCIUM 8.8 8.7 8.7     Recent Labs     11/13/20  1710 11/14/20  0601   AST 55* 40   ALT 59 52   BILITOT 0.5 0.4   ALKPHOS 103 104     No results for input(s): INR in the last 72 hours. No results for input(s): [place] in the last 72 hours. Recent Labs     11/13/20  1308 11/13/20  1710   PROCAL 0.17* 0.17*       Microbiology:      Urinalysis:    No results found for: NITRU, WBCUA, BACTERIA, RBCUA, BLOODU, SPECGRAV, GLUCOSEU    Radiology:  XR CHEST PORTABLE   Final Result   1. Normal heart size. No effusion is seen. 2. Moderate groundglass infiltrates both mid and lower lung fields, consistent with pneumonia versus pulmonary edema. **This report has been created using voice recognition software. It may contain minor errors which are inherent in voice recognition technology. **      Final report electronically signed by Dr. Rafael Hogue on 11/13/2020 12:55 PM          DVT prophylaxis: [] Lovenox                                 [] SCDs                                 [] SQ Heparin                                 [] Encourage ambulation           [x] Already on Anticoagulation     Code Status: No Order    Tele:   [x] yes             [] no    Active Hospital Problems    Diagnosis Date Noted    COVID-19 [U07.1] 11/13/2020    Type II or unspecified type diabetes mellitus without mention of complication, not stated as uncontrolled [E11.9]     [facility] [I27.24]        Electronically signed by AMANDA Puente CNP on 11/14/2020 at 7:39 AM

## 2020-11-14 NOTE — FLOWSHEET NOTE
11/14/20 1145   Provider Notification   Reason for Communication New orders   Provider Name Kayla Galdamez   Provider Notification Advance Practice Clinician (CNS, NP, CNM, CRNA, PA)   Method of Communication Secure Message   Response See orders       Notified Provider of patient request and adjustments to medication orders.

## 2020-11-14 NOTE — ED NOTES
ED nurse-to-nurse bedside report    Chief Complaint   Patient presents with    Shortness of Breath    Nausea & Vomiting    Diarrhea    Knee Pain      LOC: alert and orientated to name, place, date  Vital signs   Vitals:    11/13/20 1517 11/13/20 1620 11/13/20 1639 11/13/20 1828   BP: (!) 128/46  (!) 130/50 (!) 122/51   Pulse: 78  84 81   Resp: 28  30 26   Temp:       TempSrc:       SpO2: 98% 95% 95% 96%   Weight:          Pain:    Pain Interventions: none  Pain Goal: 3/10  Oxygen: Yes    Current needs required [place] 60L   Telemetry: Yes  LDAs:   Peripheral IV 11/13/20 Right Hand (Active)     Continuous Infusions:    sodium chloride 100 mL/hr at 11/13/20 1528    dextrose       Mobility: Requires assistance * 1  Jessica Fall Risk Score: No flowsheet data found.   Fall Interventions: fall band  Report given to: Robel Horne RN     Delaney Houston RN  11/13/20 1094

## 2020-11-14 NOTE — ED PROVIDER NOTES
[address] [facility]  EMERGENCY MEDICINE ATTENDING ATTESTATION      Evaluation of Suleman Reed. Case discussed and care plan developed with nurse practitioner. I agree with the nurse practitioner documentation and plan as documented by him, except if my documentation differs. Patient case discussed with me after patient care had been completed. I reviewed the medical, surgical, family and social history, medications and allergies. I have reviewed the nursing documentation. I have reviewed the patient's vital signs and are abnormal from hypoxia, febrile and tachycardia per my interpretation. Pulsoxymetry is abnormal per my interpretation. Brief H&P   Patient c/o SOB. The patient has a history of pulmonary hypertension as well as shortness of breath. Patient is also had a cough. The patient is on 5 L per her concentrator at home. She has had some diarrhea. She tested positive for Covid Thursday. She is also have a sister that tested positive. She denies any nausea or vomiting. . Reportedly at home with ambulation her pulse ox is dropping down to 83% on 5 L. [place] Physical exam is notable for well appearing, dyspneic and hypoxic. Medical Decision Making   MDM: Patient presents with Covid and hypoxia. Will evaluate for concurrent bacterial pneumonia, CHF, PE  Plan: Admit for management    Please see the nurse practitioner completed note for final disposition except as documented on this attestation. I have reviewed and interpreted all available lab, radiology and ekg results available at the moment. Diagnosis, treatment and disposition plans were discussed and agreed upon by patient. This transcription was electronically signed. It was dictated by use of voice recognition software and electronically transcribed. The transcription may contain errors not detected in proofreading.      I performed direct supervision and was present for the critical portion following procedures: None  Critical care time on this case: None    Electronically signed by Karlos Farrell MD on 11/13/20 at 7:12 PM NICOLE Delaney MD  11/13/20 9014

## 2020-11-15 LAB
ALBUMIN SERPL-MCNC: 3 G/DL (ref 3.5–5.1)
ALP BLD-CCNC: 96 U/L (ref 38–126)
ALT SERPL-CCNC: 46 U/L (ref 11–66)
ANION GAP SERPL CALCULATED.3IONS-SCNC: 11 MEQ/L (ref 8–16)
AST SERPL-CCNC: 37 U/L (ref 5–40)
BASOPHILS # BLD: 0 %
BASOPHILS ABSOLUTE: 0 THOU/MM3 (ref 0–0.1)
BILIRUB SERPL-MCNC: 0.4 MG/DL (ref 0.3–1.2)
BUN BLDV-MCNC: 18 MG/DL (ref 7–22)
C-REACTIVE PROTEIN: 2.55 MG/DL (ref 0–1)
CALCIUM SERPL-MCNC: 8.2 MG/DL (ref 8.5–10.5)
CHLORIDE BLD-SCNC: 94 MEQ/L (ref 98–111)
CO2: 25 MEQ/L (ref 23–33)
CREAT SERPL-MCNC: 0.8 MG/DL (ref 0.4–1.2)
D-DIMER QUANTITATIVE: 342 NG/ML FEU (ref 0–500)
EOSINOPHIL # BLD: 0 %
EOSINOPHILS ABSOLUTE: 0 THOU/MM3 (ref 0–0.4)
ERYTHROCYTE [DISTWIDTH] IN BLOOD BY AUTOMATED COUNT: 13.5 % (ref 11.5–14.5)
ERYTHROCYTE [DISTWIDTH] IN BLOOD BY AUTOMATED COUNT: 43.3 FL (ref 35–45)
FIBRINOGEN: 456 MG/100ML (ref 155–475)
GFR SERPL CREATININE-BSD FRML MDRD: 73 ML/MIN/1.73M2
GLUCOSE BLD-MCNC: 177 MG/DL (ref 70–108)
GLUCOSE BLD-MCNC: 196 MG/DL (ref 70–108)
GLUCOSE BLD-MCNC: 257 MG/DL (ref 70–108)
GLUCOSE BLD-MCNC: 294 MG/DL (ref 70–108)
HCT VFR BLD CALC: 34.3 % (ref 37–47)
HEMOGLOBIN: 11.5 GM/DL (ref 12–16)
IMMATURE GRANS (ABS): 0.03 THOU/MM3 (ref 0–0.07)
IMMATURE GRANULOCYTES: 0.8 %
LYMPHOCYTES # BLD: 19.9 %
LYMPHOCYTES ABSOLUTE: 0.7 THOU/MM3 (ref 1–4.8)
MCH RBC QN AUTO: 29.2 PG (ref 26–33)
MCHC RBC AUTO-ENTMCNC: 33.5 GM/DL (ref 32.2–35.5)
MCV RBC AUTO: 87.1 FL (ref 81–99)
MONOCYTES # BLD: 8.9 %
MONOCYTES ABSOLUTE: 0.3 THOU/MM3 (ref 0.4–1.3)
NUCLEATED RED BLOOD CELLS: 0 /100 WBC
PLATELET # BLD: 158 THOU/MM3 (ref 130–400)
PMV BLD AUTO: 11.7 FL (ref 9.4–12.4)
POTASSIUM REFLEX MAGNESIUM: 4 MEQ/L (ref 3.5–5.2)
RBC # BLD: 3.94 MILL/MM3 (ref 4.2–5.4)
SEG NEUTROPHILS: 70.4 %
SEGMENTED NEUTROPHILS ABSOLUTE COUNT: 2.6 THOU/MM3 (ref 1.8–7.7)
SODIUM BLD-SCNC: 130 MEQ/L (ref 135–145)
SODIUM BLD-SCNC: 133 MEQ/L (ref 135–145)
TOTAL PROTEIN: 6.3 G/DL (ref 6.1–8)
WBC # BLD: 3.7 THOU/MM3 (ref 4.8–10.8)

## 2020-11-15 PROCEDURE — 84295 ASSAY OF SERUM SODIUM: CPT

## 2020-11-15 PROCEDURE — 85385 FIBRINOGEN ANTIGEN: CPT

## 2020-11-15 PROCEDURE — 99232 SBSQ HOSP IP/OBS MODERATE 35: CPT | Performed by: NURSE PRACTITIONER

## 2020-11-15 PROCEDURE — 85025 COMPLETE CBC W/AUTO DIFF WBC: CPT

## 2020-11-15 PROCEDURE — 6370000000 HC RX 637 (ALT 250 FOR IP): Performed by: NURSE PRACTITIONER

## 2020-11-15 PROCEDURE — 82948 REAGENT STRIP/BLOOD GLUCOSE: CPT

## 2020-11-15 PROCEDURE — 36430 TRANSFUSION BLD/BLD COMPNT: CPT

## 2020-11-15 PROCEDURE — 2580000003 HC RX 258: Performed by: INTERNAL MEDICINE

## 2020-11-15 PROCEDURE — 2060000000 HC ICU INTERMEDIATE R&B

## 2020-11-15 PROCEDURE — 2580000003 HC RX 258: Performed by: NURSE PRACTITIONER

## 2020-11-15 PROCEDURE — 2500000003 HC RX 250 WO HCPCS: Performed by: INTERNAL MEDICINE

## 2020-11-15 PROCEDURE — 36415 COLL VENOUS BLD VENIPUNCTURE: CPT

## 2020-11-15 PROCEDURE — 2700000000 HC OXYGEN THERAPY PER DAY

## 2020-11-15 PROCEDURE — 6360000002 HC RX W HCPCS: Performed by: NURSE PRACTITIONER

## 2020-11-15 PROCEDURE — 85379 FIBRIN DEGRADATION QUANT: CPT

## 2020-11-15 PROCEDURE — 6370000000 HC RX 637 (ALT 250 FOR IP): Performed by: INTERNAL MEDICINE

## 2020-11-15 PROCEDURE — 94669 MECHANICAL CHEST WALL OSCILL: CPT

## 2020-11-15 PROCEDURE — 80053 COMPREHEN METABOLIC PANEL: CPT

## 2020-11-15 PROCEDURE — 6360000002 HC RX W HCPCS: Performed by: INTERNAL MEDICINE

## 2020-11-15 PROCEDURE — 86140 C-REACTIVE PROTEIN: CPT

## 2020-11-15 PROCEDURE — 94761 N-INVAS EAR/PLS OXIMETRY MLT: CPT

## 2020-11-15 PROCEDURE — P9059 PLASMA, FRZ BETWEEN 8-24HOUR: HCPCS

## 2020-11-15 RX ORDER — 0.9 % SODIUM CHLORIDE 0.9 %
20 INTRAVENOUS SOLUTION INTRAVENOUS ONCE
Status: COMPLETED | OUTPATIENT
Start: 2020-11-15 | End: 2020-11-15

## 2020-11-15 RX ORDER — HYDROXYZINE PAMOATE 25 MG/1
25 CAPSULE ORAL EVERY 6 HOURS PRN
Status: DISCONTINUED | OUTPATIENT
Start: 2020-11-15 | End: 2020-11-20 | Stop reason: HOSPADM

## 2020-11-15 RX ADMIN — SODIUM CHLORIDE 20 ML: 9 INJECTION, SOLUTION INTRAVENOUS at 16:08

## 2020-11-15 RX ADMIN — ALOGLIPTIN 25 MG: 25 TABLET, FILM COATED ORAL at 08:10

## 2020-11-15 RX ADMIN — SODIUM CHLORIDE: 9 INJECTION, SOLUTION INTRAVENOUS at 10:21

## 2020-11-15 RX ADMIN — CETIRIZINE HYDROCHLORIDE 10 MG: 10 TABLET, FILM COATED ORAL at 08:10

## 2020-11-15 RX ADMIN — TOPIRAMATE 100 MG: 100 TABLET, FILM COATED ORAL at 21:08

## 2020-11-15 RX ADMIN — FAMOTIDINE 20 MG: 20 TABLET, FILM COATED ORAL at 08:11

## 2020-11-15 RX ADMIN — HYDROXYZINE PAMOATE 25 MG: 25 CAPSULE ORAL at 23:09

## 2020-11-15 RX ADMIN — APIXABAN 5 MG: 5 TABLET, FILM COATED ORAL at 08:10

## 2020-11-15 RX ADMIN — ESCITALOPRAM 10 MG: 10 TABLET, FILM COATED ORAL at 08:10

## 2020-11-15 RX ADMIN — FAMOTIDINE 20 MG: 20 TABLET, FILM COATED ORAL at 21:08

## 2020-11-15 RX ADMIN — LOPERAMIDE HYDROCHLORIDE 2 MG: 2 CAPSULE ORAL at 12:03

## 2020-11-15 RX ADMIN — ONDANSETRON 4 MG: 2 INJECTION INTRAMUSCULAR; INTRAVENOUS at 09:58

## 2020-11-15 RX ADMIN — Medication 50 MG: at 08:11

## 2020-11-15 RX ADMIN — TOPIRAMATE 100 MG: 100 TABLET, FILM COATED ORAL at 08:10

## 2020-11-15 RX ADMIN — POTASSIUM CHLORIDE 10 MEQ: 750 TABLET, FILM COATED, EXTENDED RELEASE ORAL at 08:11

## 2020-11-15 RX ADMIN — HYDROXYZINE PAMOATE 25 MG: 25 CAPSULE ORAL at 16:47

## 2020-11-15 RX ADMIN — REMDESIVIR 100 MG: 100 INJECTION, POWDER, LYOPHILIZED, FOR SOLUTION INTRAVENOUS at 00:36

## 2020-11-15 RX ADMIN — CARIPRAZINE 1.5 MG: 1.5 CAPSULE, GELATIN COATED ORAL at 08:12

## 2020-11-15 RX ADMIN — ROSUVASTATIN CALCIUM 10 MG: 10 TABLET, FILM COATED ORAL at 18:25

## 2020-11-15 RX ADMIN — DEXAMETHASONE 6 MG: 4 TABLET ORAL at 08:11

## 2020-11-15 RX ADMIN — APIXABAN 5 MG: 5 TABLET, FILM COATED ORAL at 21:08

## 2020-11-15 RX ADMIN — Medication 1000 MCG: at 08:11

## 2020-11-15 RX ADMIN — POTASSIUM CHLORIDE 10 MEQ: 750 TABLET, FILM COATED, EXTENDED RELEASE ORAL at 21:08

## 2020-11-15 RX ADMIN — Medication 2000 UNITS: at 08:11

## 2020-11-15 NOTE — PROGRESS NOTES
Hospitalist Progress Note    Patient:  Ada Butcher      Unit/Bed:6A-08/008-A    YOB: 1960    MRN: [id number]       Acct: [de-identified]     PCP: Syd Padgett DO    Date of Admission: 11/13/2020    Assessment/Plan:    1. COVID-19: IS/Acapella, Vitamin C, Vitamin D, Zinc, Decadron, Remdesivir. S/p 1 unit Convalescent Plasma 11/14, will repeat today with ongoing SOB and Hypoxia. Viral PNA, little concern for bacterial overlap. Procalcitonin 0.17, antibiotics not indicated at this time. 2. Acute on chronic hypoxic respiratory failure, secondary to COVID 19: HX of pulmonary HTN. Baseline 5 liters/NC. Currently up to 70 liters via HFNC, 70% FiO2. Wean as able. 3. Hyponatremia, hypovolemic. Improvement with holding Demadex and Aldactone and doing gentle hydration.  stop  Serial Na+ checks. .      4. CTEPH: Continue home medications. 5.   DMII: Acute hyperglycemia secondary to steroid use. Continue coverage via Insulin pump. 6. Morbid obesity. BMI 44.63.  7. HX DVT. On eliquis. Chief Complaint: Shortness of breath, diarrhea, fever    Hospital Course: 61 y.o. female who presented to [address] [place] [address] with above sxs for the past 6 days. She has a hx of pulmonary hypertension related to chronic pulmonary emboli and is on pulmonary vasodilators. She has been ill for the past 6 days and came to ER. She is on 5 liters of 02 at home and desatted on that. Therefore she is being admitted.     She is a diabetic. She denies heart problems. She is an exsmoker. Subjective (past 24 hours): Mild SOB at rest, worse with activity. Denies pain. Using her insulin pump.        Medications:  Reviewed    Infusion Medications    sodium chloride 50 mL/hr at 11/15/20 1021    dextrose       Scheduled Medications    insulin lispro  0-12 Units Subcutaneous TID WC    insulin lispro  0-6 Units Subcutaneous Nightly    apixaban  5 mg Oral BID    cetirizine  10 mg Oral Daily    WBC 4.0* 3.0* 3.7*   HGB 12.0 11.6* 11.5*   HCT 38.1 34.3* 34.3*    141 158     Recent Labs     11/13/20  1710 11/14/20  0601 11/14/20  2319 11/15/20  0547 11/15/20  0817   * 128* 127* 130* 133*   K 4.4 4.4  --  4.0  --    CL 97* 97*  --  94*  --    CO2 21* 22*  --  25  --    BUN 10 11  --  18  --    CREATININE 0.7 0.6  --  0.8  --    CALCIUM 8.7 8.7  --  8.2*  --      Recent Labs     11/13/20  1710 11/14/20  0601 11/15/20  0547   AST 55* 40 37   ALT 59 52 46   BILITOT 0.5 0.4 0.4   ALKPHOS 103 104 96     No results for input(s): INR in the last 72 hours. No results for input(s): Hiren Bryant in the last 72 hours. Recent Labs     11/13/20  1308 11/13/20 1710   PROCAL 0.17* 0.17*       Microbiology:      Urinalysis:    No results found for: NITRU, WBCUA, BACTERIA, RBCUA, BLOODU, SPECGRAV, GLUCOSEU    Radiology:  XR CHEST PORTABLE   Final Result   1. Normal heart size. No effusion is seen. 2. Moderate groundglass infiltrates both mid and lower lung fields, consistent with pneumonia versus pulmonary edema. **This report has been created using voice recognition software. It may contain minor errors which are inherent in voice recognition technology. **      Final report electronically signed by Dr. Macy Alva on 11/13/2020 12:55 PM          DVT prophylaxis: [] Lovenox                                 [] SCDs                                 [] SQ Heparin                                 [] Encourage ambulation           [x] Already on Anticoagulation     Code Status: No Order    Tele:   [x] yes             [] no    Active Hospital Problems    Diagnosis Date Noted    COVID-19 [U07.1] 11/13/2020    Type II or unspecified type diabetes mellitus without mention of complication, not stated as uncontrolled [E11.9]     CTEPH (chronic thromboembolic pulmonary hypertension) ([facility] [I27.24]        Electronically signed by AMANDA Smith - CNP on 11/15/2020 at 2:29 PM

## 2020-11-15 NOTE — FLOWSHEET NOTE
11/15/20 8681   Family/Significant Other Communication   Reason Update   Name Conchita Liming   Relationship Spouse/Significant Other   Response No answer-unable to leave    Method of Communication Phone

## 2020-11-15 NOTE — PROGRESS NOTES
This RN called patient's , Michael Medley, per request to update him as well this shift. She would like him to be the contact for daily shift reports. No answer, message left regarding shiftly report on his wife. Callback from [place], updated him on patient status and plan of care.

## 2020-11-15 NOTE — PROGRESS NOTES
Notified Nakia Becker that patient has insulin pump she is using to give insulin. Patient notified this RN 5.7 units was given for her lunch blood sugar of 257. See MAR.

## 2020-11-15 NOTE — FLOWSHEET NOTE
11/15/20 0016   Provider Notification   Reason for Communication Critical Value (comment)  ()   Provider Name [facility]   Provider Notification Advance Practice Clinician (CNS, NP, CNM, CRNA, PA)   Method of Communication Secure Message   Response Waiting for response     [phone] From: Keila Luna RN [facility] 6A RE: Maggy Sawyer [id number]: Patient is COVID +, on hiflo 70/60, NA has been trending down. Most recent is 127. Patient had 0.9NS running at 50mL started yesterday at 1700.

## 2020-11-15 NOTE — PROGRESS NOTES
When patient transfers to bedside commode oxygen drops to 87%, rebounds quickly, within 1 minute, back up to 92%.

## 2020-11-16 LAB
ALBUMIN SERPL-MCNC: 3.2 G/DL (ref 3.5–5.1)
ALP BLD-CCNC: 95 U/L (ref 38–126)
ALT SERPL-CCNC: 40 U/L (ref 11–66)
ANION GAP SERPL CALCULATED.3IONS-SCNC: 12 MEQ/L (ref 8–16)
AST SERPL-CCNC: 37 U/L (ref 5–40)
BASOPHILS # BLD: 0.2 %
BASOPHILS ABSOLUTE: 0 THOU/MM3 (ref 0–0.1)
BILIRUB SERPL-MCNC: 0.4 MG/DL (ref 0.3–1.2)
BUN BLDV-MCNC: 14 MG/DL (ref 7–22)
CALCIUM SERPL-MCNC: 8.7 MG/DL (ref 8.5–10.5)
CHLORIDE BLD-SCNC: 100 MEQ/L (ref 98–111)
CO2: 21 MEQ/L (ref 23–33)
CREAT SERPL-MCNC: 0.6 MG/DL (ref 0.4–1.2)
EOSINOPHIL # BLD: 0 %
EOSINOPHILS ABSOLUTE: 0 THOU/MM3 (ref 0–0.4)
ERYTHROCYTE [DISTWIDTH] IN BLOOD BY AUTOMATED COUNT: 13.6 % (ref 11.5–14.5)
ERYTHROCYTE [DISTWIDTH] IN BLOOD BY AUTOMATED COUNT: 43.8 FL (ref 35–45)
GFR SERPL CREATININE-BSD FRML MDRD: > 90 ML/MIN/1.73M2
GLUCOSE BLD-MCNC: 66 MG/DL (ref 70–108)
GLUCOSE BLD-MCNC: 70 MG/DL (ref 70–108)
HCT VFR BLD CALC: 37.9 % (ref 37–47)
HEMOGLOBIN: 12.6 GM/DL (ref 12–16)
IMMATURE GRANS (ABS): 0.06 THOU/MM3 (ref 0–0.07)
IMMATURE GRANULOCYTES: 0.9 %
LYMPHOCYTES # BLD: 13.1 %
LYMPHOCYTES ABSOLUTE: 0.8 THOU/MM3 (ref 1–4.8)
MAGNESIUM: 1.9 MG/DL (ref 1.6–2.4)
MCH RBC QN AUTO: 29.4 PG (ref 26–33)
MCHC RBC AUTO-ENTMCNC: 33.2 GM/DL (ref 32.2–35.5)
MCV RBC AUTO: 88.3 FL (ref 81–99)
MONOCYTES # BLD: 7.4 %
MONOCYTES ABSOLUTE: 0.5 THOU/MM3 (ref 0.4–1.3)
NUCLEATED RED BLOOD CELLS: 0 /100 WBC
PLATELET # BLD: 181 THOU/MM3 (ref 130–400)
PMV BLD AUTO: 11.4 FL (ref 9.4–12.4)
POTASSIUM REFLEX MAGNESIUM: 2.9 MEQ/L (ref 3.5–5.2)
RBC # BLD: 4.29 MILL/MM3 (ref 4.2–5.4)
SEG NEUTROPHILS: 78.4 %
SEGMENTED NEUTROPHILS ABSOLUTE COUNT: 5 THOU/MM3 (ref 1.8–7.7)
SODIUM BLD-SCNC: 133 MEQ/L (ref 135–145)
TOTAL PROTEIN: 6.7 G/DL (ref 6.1–8)
WBC # BLD: 6.4 THOU/MM3 (ref 4.8–10.8)

## 2020-11-16 PROCEDURE — 6370000000 HC RX 637 (ALT 250 FOR IP): Performed by: NURSE PRACTITIONER

## 2020-11-16 PROCEDURE — 2500000003 HC RX 250 WO HCPCS: Performed by: INTERNAL MEDICINE

## 2020-11-16 PROCEDURE — 94761 N-INVAS EAR/PLS OXIMETRY MLT: CPT

## 2020-11-16 PROCEDURE — 82948 REAGENT STRIP/BLOOD GLUCOSE: CPT

## 2020-11-16 PROCEDURE — 85025 COMPLETE CBC W/AUTO DIFF WBC: CPT

## 2020-11-16 PROCEDURE — 6370000000 HC RX 637 (ALT 250 FOR IP): Performed by: INTERNAL MEDICINE

## 2020-11-16 PROCEDURE — 36415 COLL VENOUS BLD VENIPUNCTURE: CPT

## 2020-11-16 PROCEDURE — 80053 COMPREHEN METABOLIC PANEL: CPT

## 2020-11-16 PROCEDURE — 2060000000 HC ICU INTERMEDIATE R&B

## 2020-11-16 PROCEDURE — 6360000002 HC RX W HCPCS: Performed by: INTERNAL MEDICINE

## 2020-11-16 PROCEDURE — 99232 SBSQ HOSP IP/OBS MODERATE 35: CPT | Performed by: FAMILY MEDICINE

## 2020-11-16 PROCEDURE — 2580000003 HC RX 258: Performed by: NURSE PRACTITIONER

## 2020-11-16 PROCEDURE — 6370000000 HC RX 637 (ALT 250 FOR IP): Performed by: FAMILY MEDICINE

## 2020-11-16 PROCEDURE — 2580000003 HC RX 258: Performed by: INTERNAL MEDICINE

## 2020-11-16 PROCEDURE — 2700000000 HC OXYGEN THERAPY PER DAY

## 2020-11-16 PROCEDURE — 83735 ASSAY OF MAGNESIUM: CPT

## 2020-11-16 RX ORDER — POTASSIUM CHLORIDE 20 MEQ/1
40 TABLET, EXTENDED RELEASE ORAL PRN
Status: DISCONTINUED | OUTPATIENT
Start: 2020-11-16 | End: 2020-11-20 | Stop reason: HOSPADM

## 2020-11-16 RX ORDER — POTASSIUM CHLORIDE 20 MEQ/1
40 TABLET, EXTENDED RELEASE ORAL ONCE
Status: COMPLETED | OUTPATIENT
Start: 2020-11-16 | End: 2020-11-16

## 2020-11-16 RX ORDER — POTASSIUM CHLORIDE 7.45 MG/ML
10 INJECTION INTRAVENOUS PRN
Status: DISCONTINUED | OUTPATIENT
Start: 2020-11-16 | End: 2020-11-20 | Stop reason: HOSPADM

## 2020-11-16 RX ADMIN — SODIUM CHLORIDE: 9 INJECTION, SOLUTION INTRAVENOUS at 15:14

## 2020-11-16 RX ADMIN — HYDROXYZINE PAMOATE 25 MG: 25 CAPSULE ORAL at 10:04

## 2020-11-16 RX ADMIN — TOPIRAMATE 100 MG: 100 TABLET, FILM COATED ORAL at 08:24

## 2020-11-16 RX ADMIN — Medication 50 MG: at 08:24

## 2020-11-16 RX ADMIN — POTASSIUM CHLORIDE 10 MEQ: 750 TABLET, FILM COATED, EXTENDED RELEASE ORAL at 22:16

## 2020-11-16 RX ADMIN — CETIRIZINE HYDROCHLORIDE 10 MG: 10 TABLET, FILM COATED ORAL at 08:25

## 2020-11-16 RX ADMIN — Medication 2000 UNITS: at 08:24

## 2020-11-16 RX ADMIN — POTASSIUM CHLORIDE 10 MEQ: 750 TABLET, FILM COATED, EXTENDED RELEASE ORAL at 08:24

## 2020-11-16 RX ADMIN — FAMOTIDINE 20 MG: 20 TABLET, FILM COATED ORAL at 22:03

## 2020-11-16 RX ADMIN — ESCITALOPRAM 10 MG: 10 TABLET, FILM COATED ORAL at 08:24

## 2020-11-16 RX ADMIN — REMDESIVIR 100 MG: 100 INJECTION, POWDER, LYOPHILIZED, FOR SOLUTION INTRAVENOUS at 00:12

## 2020-11-16 RX ADMIN — DEXAMETHASONE 6 MG: 4 TABLET ORAL at 08:25

## 2020-11-16 RX ADMIN — CARIPRAZINE 1.5 MG: 1.5 CAPSULE, GELATIN COATED ORAL at 08:25

## 2020-11-16 RX ADMIN — APIXABAN 5 MG: 5 TABLET, FILM COATED ORAL at 22:03

## 2020-11-16 RX ADMIN — FAMOTIDINE 20 MG: 20 TABLET, FILM COATED ORAL at 08:24

## 2020-11-16 RX ADMIN — POTASSIUM CHLORIDE 40 MEQ: 1500 TABLET, EXTENDED RELEASE ORAL at 10:04

## 2020-11-16 RX ADMIN — ROSUVASTATIN CALCIUM 10 MG: 10 TABLET, FILM COATED ORAL at 17:17

## 2020-11-16 RX ADMIN — Medication 1000 MCG: at 08:30

## 2020-11-16 RX ADMIN — ALOGLIPTIN 25 MG: 25 TABLET, FILM COATED ORAL at 08:25

## 2020-11-16 RX ADMIN — HYDROXYZINE PAMOATE 25 MG: 25 CAPSULE ORAL at 22:16

## 2020-11-16 RX ADMIN — TOPIRAMATE 100 MG: 100 TABLET, FILM COATED ORAL at 22:03

## 2020-11-16 RX ADMIN — APIXABAN 5 MG: 5 TABLET, FILM COATED ORAL at 08:25

## 2020-11-16 ASSESSMENT — PAIN SCALES - GENERAL
PAINLEVEL_OUTOF10: 0

## 2020-11-16 NOTE — PROGRESS NOTES
Hospitalist Progress Note    Patient:  Lamonte Mortimer      Unit/Bed:6A-08/008-A    YOB: 1960    MRN: [id number]       Acct: [de-identified]     PCP: Toshia Alejo DO    Date of Admission: 11/13/2020    Assessment/Plan:    1. COVID-19: IS/Acapella, Vitamin C, Vitamin D, Zinc, Decadron, Remdesivir. S/p 2u of plasma. Viral PNA, little concern for bacterial overlap. Procalcitonin 0.17, antibiotics not indicated at this time. 2. Acute on chronic hypoxic respiratory failure, secondary to COVID 19: HX of pulmonary HTN. Baseline 5 liters/NC. Currently up to 70 liters via HFNC, 70% FiO2. Wean as able. 3. Hyponatremia, hypovolemic. Improvement with holding Demadex and Aldactone and doing gentle hydration. 4. Hypokalemia - replacement protocol   5. CTEPH: Continue home medications. 6.  DMII: Acute hyperglycemia secondary to steroid use. Continue coverage via Insulin pump. 7. Morbid obesity. BMI 44.63.  8. HX DVT. On eliquis. Chief Complaint: Shortness of breath, diarrhea, fever    Hospital Course: 61 y.o. female who presented to [facility] with above sxs for the past 6 days. She has a hx of pulmonary hypertension related to chronic pulmonary emboli and is on pulmonary vasodilators. She has been ill for the past 6 days and came to ER. She is on 5 liters of 02 at home and desatted on that. Therefore she is being admitted.     She is a diabetic. She denies heart problems. She is an exsmoker. Subjective (past 24 hours):   Slowly improving.  No acute events    Medications:  Reviewed    Infusion Medications    sodium chloride 50 mL/hr at 11/16/20 1514    dextrose       Scheduled Medications    potassium replacement protocol   Other RX Placeholder    insulin lispro  0-12 Units Subcutaneous TID WC    insulin lispro  0-6 Units Subcutaneous Nightly    apixaban  5 mg Oral BID    cetirizine  10 mg Oral Daily    escitalopram  10 mg Oral Daily    rosuvastatin  10 mg Oral QPM    alogliptin  25 mg Oral Daily    dexamethasone  6 mg Oral Daily    Vitamin D  2,000 Units Oral Daily    zinc sulfate  50 mg Oral Daily    sodium chloride  20 mL Intravenous Once    cariprazine hcl  1.5 mg Oral Daily    famotidine  20 mg Oral BID    potassium chloride  10 mEq Oral BID    Riociguat  2.5 mg Oral TID    topiramate  100 mg Oral BID    vitamin B-12  1,000 mcg Oral Daily    remdesivir IVPB  100 mg Intravenous Q24H     PRN Meds: hydrOXYzine, loperamide, ondansetron, acetaminophen **OR** acetaminophen, glucose, dextrose, glucagon (rDNA), dextrose, sodium chloride      Intake/Output Summary (Last 24 hours) at 11/16/2020 1725  Last data filed at 11/16/2020 0400  Gross per 24 hour   Intake 241.67 ml   Output 600 ml   Net -358.33 ml       Diet:  DIET CARB CONTROL; Exam:  BP (!) 122/53   Pulse 91   Temp 98.5 °F (36.9 °C) (Oral)   Resp 26   Wt 260 lb (117.9 kg)   SpO2 93%   BMI 44.63 kg/m²     General appearance: Fatigued, appears stated age and cooperative. HEENT: Pupils equal, round, and reactive to light. Conjunctivae/corneas clear. Neck: Supple, with full range of motion. No jugular venous distention. Trachea midline. Respiratory:  Normal respiratory effort. Diminished throughout. Cardiovascular: Regular rate and rhythm with normal S1/S2 without murmurs, rubs or gallops. Abdomen: Soft, obese, non-tender, non-distended with normal bowel sounds. Musculoskeletal: passive and active ROM x 4 extremities.   Skin: Skin color, texture, turgor normal.    Neurologic: grossly non-focal.  Psychiatric: Alert and oriented, thought content appropriate  Capillary Refill: Brisk,< 3 seconds   Peripheral Pulses: +2 palpable, equal bilaterally       Labs:   Recent Labs     11/14/20  0601 11/15/20  0547 11/16/20  0614   WBC 3.0* 3.7* 6.4   HGB 11.6* 11.5* 12.6   HCT 34.3* 34.3* 37.9    158 181     Recent Labs     11/14/20  0601  11/15/20  0547 11/15/20  0817 11/16/20  0614   *   < > 130* 133* 133*   K 4.4  --  4.0  --  2.9*   CL 97*  --  94*  --  100   CO2 22*  --  25  --  21*   BUN 11  --  18  --  14   CREATININE 0.6  --  0.8  --  0.6   CALCIUM 8.7  --  8.2*  --  8.7    < > = values in this interval not displayed. Recent Labs     11/14/20  0601 11/15/20  0547 11/16/20  0614   AST 40 37 37   ALT 52 46 40   BILITOT 0.4 0.4 0.4   ALKPHOS 104 96 95     No results for input(s): INR in the last 72 hours. No results for input(s): Barron Zuni in the last 72 hours. No results for input(s): PROCAL in the last 72 hours. Microbiology:      Urinalysis:    No results found for: Indra Rocker, BACTERIA, RBCUA, BLOODU, SPECGRAV, Luis [place] 994    Radiology:  XR CHEST PORTABLE   Final Result   1. Normal heart size. No effusion is seen. 2. Moderate groundglass infiltrates both mid and lower lung fields, consistent with pneumonia versus pulmonary edema. **This report has been created using voice recognition software. It may contain minor errors which are inherent in voice recognition technology. **      Final report electronically signed by Dr. Bailey Love on 11/13/2020 12:55 PM          DVT prophylaxis: [] Lovenox                                 [] SCDs                                 [] SQ Heparin                                 [] Encourage ambulation           [x] Already on Anticoagulation     Code Status: Full Code    Tele:   [x] yes             [] no    Active Hospital Problems    Diagnosis Date Noted    COVID-19 [U07.1] 11/13/2020    Type II or unspecified type diabetes mellitus without mention of complication, not stated as uncontrolled [E11.9]     CTEPH (chronic thromboembolic pulmonary hypertension) ([facility] [I27.24]        Electronically signed by Kathy Moreno MD on 11/16/2020 at 5:25 PM

## 2020-11-16 NOTE — CARE COORDINATION
DISASTER CHARTING    11/16/20, 10:53 AM EST    DISCHARGE ONGOING EVALUATION:     [facility] day: 3  Location: 6A-08/008-A Reason for admit: COVID-19 [U07.1]   Barriers to Discharge: Being treated with Remdesivir, convalescent plasma. Requiring hi flow oxygen at 60L. PCP: Kayce Peck, DO  Patient Goals/Plan/Treatment Preferences: Spoke with [facility]  Dayla Bamberger is from home and uses oxygen at 5L supplied by [address]. She had HH for blood draws in the past, but Linnea Felder doesn't believe they are coming any longer. She does have a walker at home that she just started using in the past week. He is unsure of discharge needs at this time, but does state they are unhappy with [facility]'s service and would like to re-eval for need and equipment prior to discharge.

## 2020-11-17 LAB
ALBUMIN SERPL-MCNC: 3.3 G/DL (ref 3.5–5.1)
ALP BLD-CCNC: 94 U/L (ref 38–126)
ALT SERPL-CCNC: 31 U/L (ref 11–66)
ANION GAP SERPL CALCULATED.3IONS-SCNC: 11 MEQ/L (ref 8–16)
AST SERPL-CCNC: 22 U/L (ref 5–40)
BASOPHILS # BLD: 0.2 %
BASOPHILS ABSOLUTE: 0 THOU/MM3 (ref 0–0.1)
BILIRUB SERPL-MCNC: 0.4 MG/DL (ref 0.3–1.2)
BUN BLDV-MCNC: 12 MG/DL (ref 7–22)
CALCIUM SERPL-MCNC: 8.5 MG/DL (ref 8.5–10.5)
CHLORIDE BLD-SCNC: 104 MEQ/L (ref 98–111)
CO2: 20 MEQ/L (ref 23–33)
CREAT SERPL-MCNC: 0.7 MG/DL (ref 0.4–1.2)
EOSINOPHIL # BLD: 0.2 %
EOSINOPHILS ABSOLUTE: 0 THOU/MM3 (ref 0–0.4)
ERYTHROCYTE [DISTWIDTH] IN BLOOD BY AUTOMATED COUNT: 13.7 % (ref 11.5–14.5)
ERYTHROCYTE [DISTWIDTH] IN BLOOD BY AUTOMATED COUNT: 43.8 FL (ref 35–45)
GFR SERPL CREATININE-BSD FRML MDRD: 85 ML/MIN/1.73M2
GLUCOSE BLD-MCNC: 101 MG/DL (ref 70–108)
GLUCOSE BLD-MCNC: 125 MG/DL (ref 70–108)
GLUCOSE BLD-MCNC: 188 MG/DL (ref 70–108)
GLUCOSE BLD-MCNC: 217 MG/DL (ref 70–108)
GLUCOSE BLD-MCNC: 72 MG/DL (ref 70–108)
HCT VFR BLD CALC: 34.8 % (ref 37–47)
HEMOGLOBIN: 11.6 GM/DL (ref 12–16)
IMMATURE GRANS (ABS): 0.09 THOU/MM3 (ref 0–0.07)
IMMATURE GRANULOCYTES: 1.8 %
LYMPHOCYTES # BLD: 19.5 %
LYMPHOCYTES ABSOLUTE: 1 THOU/MM3 (ref 1–4.8)
MAGNESIUM: 2.2 MG/DL (ref 1.6–2.4)
MCH RBC QN AUTO: 29.3 PG (ref 26–33)
MCHC RBC AUTO-ENTMCNC: 33.3 GM/DL (ref 32.2–35.5)
MCV RBC AUTO: 87.9 FL (ref 81–99)
MONOCYTES # BLD: 7.9 %
MONOCYTES ABSOLUTE: 0.4 THOU/MM3 (ref 0.4–1.3)
NUCLEATED RED BLOOD CELLS: 0 /100 WBC
PLATELET # BLD: 181 THOU/MM3 (ref 130–400)
PMV BLD AUTO: 11.1 FL (ref 9.4–12.4)
POTASSIUM REFLEX MAGNESIUM: 3.5 MEQ/L (ref 3.5–5.2)
RBC # BLD: 3.96 MILL/MM3 (ref 4.2–5.4)
SEG NEUTROPHILS: 70.4 %
SEGMENTED NEUTROPHILS ABSOLUTE COUNT: 3.4 THOU/MM3 (ref 1.8–7.7)
SODIUM BLD-SCNC: 135 MEQ/L (ref 135–145)
TOTAL PROTEIN: 5.8 G/DL (ref 6.1–8)
WBC # BLD: 4.9 THOU/MM3 (ref 4.8–10.8)

## 2020-11-17 PROCEDURE — 6370000000 HC RX 637 (ALT 250 FOR IP): Performed by: NURSE PRACTITIONER

## 2020-11-17 PROCEDURE — 6360000002 HC RX W HCPCS: Performed by: INTERNAL MEDICINE

## 2020-11-17 PROCEDURE — 2060000000 HC ICU INTERMEDIATE R&B

## 2020-11-17 PROCEDURE — 2500000003 HC RX 250 WO HCPCS: Performed by: INTERNAL MEDICINE

## 2020-11-17 PROCEDURE — 99232 SBSQ HOSP IP/OBS MODERATE 35: CPT | Performed by: FAMILY MEDICINE

## 2020-11-17 PROCEDURE — 6370000000 HC RX 637 (ALT 250 FOR IP): Performed by: FAMILY MEDICINE

## 2020-11-17 PROCEDURE — 94761 N-INVAS EAR/PLS OXIMETRY MLT: CPT

## 2020-11-17 PROCEDURE — 85025 COMPLETE CBC W/AUTO DIFF WBC: CPT

## 2020-11-17 PROCEDURE — 6370000000 HC RX 637 (ALT 250 FOR IP): Performed by: INTERNAL MEDICINE

## 2020-11-17 PROCEDURE — 80053 COMPREHEN METABOLIC PANEL: CPT

## 2020-11-17 PROCEDURE — 2700000000 HC OXYGEN THERAPY PER DAY

## 2020-11-17 PROCEDURE — 6360000002 HC RX W HCPCS: Performed by: NURSE PRACTITIONER

## 2020-11-17 PROCEDURE — 36415 COLL VENOUS BLD VENIPUNCTURE: CPT

## 2020-11-17 PROCEDURE — 83735 ASSAY OF MAGNESIUM: CPT

## 2020-11-17 PROCEDURE — 2580000003 HC RX 258: Performed by: INTERNAL MEDICINE

## 2020-11-17 PROCEDURE — 82948 REAGENT STRIP/BLOOD GLUCOSE: CPT

## 2020-11-17 RX ADMIN — POTASSIUM CHLORIDE 10 MEQ: 750 TABLET, FILM COATED, EXTENDED RELEASE ORAL at 09:11

## 2020-11-17 RX ADMIN — APIXABAN 5 MG: 5 TABLET, FILM COATED ORAL at 09:10

## 2020-11-17 RX ADMIN — CARIPRAZINE 1.5 MG: 1.5 CAPSULE, GELATIN COATED ORAL at 09:10

## 2020-11-17 RX ADMIN — POTASSIUM CHLORIDE 40 MEQ: 1500 TABLET, EXTENDED RELEASE ORAL at 09:10

## 2020-11-17 RX ADMIN — ESCITALOPRAM 10 MG: 10 TABLET, FILM COATED ORAL at 09:10

## 2020-11-17 RX ADMIN — DEXAMETHASONE 6 MG: 4 TABLET ORAL at 09:11

## 2020-11-17 RX ADMIN — TOPIRAMATE 100 MG: 100 TABLET, FILM COATED ORAL at 09:10

## 2020-11-17 RX ADMIN — FAMOTIDINE 20 MG: 20 TABLET, FILM COATED ORAL at 09:11

## 2020-11-17 RX ADMIN — ALOGLIPTIN 25 MG: 25 TABLET, FILM COATED ORAL at 09:10

## 2020-11-17 RX ADMIN — ROSUVASTATIN CALCIUM 10 MG: 10 TABLET, FILM COATED ORAL at 18:04

## 2020-11-17 RX ADMIN — LOPERAMIDE HYDROCHLORIDE 2 MG: 2 CAPSULE ORAL at 14:03

## 2020-11-17 RX ADMIN — Medication 1000 MCG: at 09:10

## 2020-11-17 RX ADMIN — HYDROXYZINE PAMOATE 25 MG: 25 CAPSULE ORAL at 10:01

## 2020-11-17 RX ADMIN — TOPIRAMATE 100 MG: 100 TABLET, FILM COATED ORAL at 20:24

## 2020-11-17 RX ADMIN — POTASSIUM CHLORIDE 10 MEQ: 750 TABLET, FILM COATED, EXTENDED RELEASE ORAL at 20:24

## 2020-11-17 RX ADMIN — ONDANSETRON 4 MG: 2 INJECTION INTRAMUSCULAR; INTRAVENOUS at 10:01

## 2020-11-17 RX ADMIN — FAMOTIDINE 20 MG: 20 TABLET, FILM COATED ORAL at 20:24

## 2020-11-17 RX ADMIN — Medication 2000 UNITS: at 09:10

## 2020-11-17 RX ADMIN — APIXABAN 5 MG: 5 TABLET, FILM COATED ORAL at 20:24

## 2020-11-17 RX ADMIN — CETIRIZINE HYDROCHLORIDE 10 MG: 10 TABLET, FILM COATED ORAL at 09:10

## 2020-11-17 RX ADMIN — HYDROXYZINE PAMOATE 25 MG: 25 CAPSULE ORAL at 20:24

## 2020-11-17 RX ADMIN — Medication 50 MG: at 09:10

## 2020-11-17 RX ADMIN — REMDESIVIR 100 MG: 100 INJECTION, POWDER, LYOPHILIZED, FOR SOLUTION INTRAVENOUS at 00:20

## 2020-11-17 ASSESSMENT — PAIN SCALES - GENERAL: PAINLEVEL_OUTOF10: 0

## 2020-11-17 NOTE — PLAN OF CARE
Problem: Gas Exchange - Impaired  Goal: Absence of hypoxia  Outcome: Ongoing  Note: Patient currently on HFNC 45L and 70%

## 2020-11-17 NOTE — PROGRESS NOTES
rosuvastatin  10 mg Oral QPM    alogliptin  25 mg Oral Daily    dexamethasone  6 mg Oral Daily    Vitamin D  2,000 Units Oral Daily    zinc sulfate  50 mg Oral Daily    sodium chloride  20 mL Intravenous Once    cariprazine hcl  1.5 mg Oral Daily    famotidine  20 mg Oral BID    potassium chloride  10 mEq Oral BID    Riociguat  2.5 mg Oral TID    topiramate  100 mg Oral BID    vitamin B-12  1,000 mcg Oral Daily    remdesivir IVPB  100 mg Intravenous Q24H     PRN Meds: potassium chloride **OR** potassium alternative oral replacement **OR** potassium chloride, hydrOXYzine, loperamide, ondansetron, acetaminophen **OR** acetaminophen, glucose, dextrose, glucagon (rDNA), dextrose, sodium chloride    No intake or output data in the 24 hours ending 11/17/20 1813    Diet:  DIET CARB CONTROL; Exam:  BP (!) 133/56   Pulse 80   Temp 98 °F (36.7 °C) (Oral)   Resp 26   Wt 260 lb (117.9 kg)   SpO2 92%   BMI 44.63 kg/m²     General appearance: Fatigued, appears stated age and cooperative. HEENT: Pupils equal, round, and reactive to light. Conjunctivae/corneas clear. Neck: Supple, with full range of motion. No jugular venous distention. Trachea midline. Respiratory:  Normal respiratory effort. Diminished throughout. Cardiovascular: Regular rate and rhythm with normal S1/S2 without murmurs, rubs or gallops. Abdomen: Soft, obese, non-tender, non-distended with normal bowel sounds. Musculoskeletal: passive and active ROM x 4 extremities.   Skin: Skin color, texture, turgor normal.    Neurologic: grossly non-focal.  Psychiatric: Alert and oriented, thought content appropriate  Capillary Refill: Brisk,< 3 seconds   Peripheral Pulses: +2 palpable, equal bilaterally     Labs:   Recent Labs     11/15/20  0547 11/16/20  0614 11/17/20  0604   WBC 3.7* 6.4 4.9   HGB 11.5* 12.6 11.6*   HCT 34.3* 37.9 34.8*    181 181     Recent Labs     11/15/20  0547 11/15/20  0817 11/16/20  0614 11/17/20  0604   * 133* 133* 135   K 4.0  --  2.9* 3.5   CL 94*  --  100 104   CO2 25  --  21* 20*   BUN 18  --  14 12   CREATININE 0.8  --  0.6 0.7   CALCIUM 8.2*  --  8.7 8.5     Recent Labs     11/15/20  0547 11/16/20  0614 11/17/20  0604   AST 37 37 22   ALT 46 40 31   BILITOT 0.4 0.4 0.4   ALKPHOS 96 95 94     No results for input(s): INR in the last 72 hours. No results for input(s): Ellie Comment in the last 72 hours. No results for input(s): PROCAL in the last 72 hours. Microbiology:      Urinalysis:    No results found for: Ceola Beery, BACTERIA, RBCUA, BLOODU, SPECGRAV, Luis [address] [place] 994    Radiology:  XR CHEST PORTABLE   Final Result   1. Normal heart size. No effusion is seen. 2. Moderate groundglass infiltrates both mid and lower lung fields, consistent with pneumonia versus pulmonary edema. **This report has been created using voice recognition software. It may contain minor errors which are inherent in voice recognition technology. **      Final report electronically signed by Dr. Bridger Broussard on 11/13/2020 12:55 PM          DVT prophylaxis: [] Lovenox                                 [] SCDs                                 [] SQ Heparin                                 [] Encourage ambulation           [x] Already on Anticoagulation     Code Status: Full Code    Tele:   [x] yes             [] no    Active Hospital Problems    Diagnosis Date Noted    COVID-19 [U07.1] 11/13/2020    Type II or unspecified type diabetes mellitus without mention of complication, not stated as uncontrolled [E11.9]     CTEPH (chronic thromboembolic pulmonary hypertension) ([facility] [I27.24]        Electronically signed by Michael Eugene MD on 11/17/2020 at 6:13 PM

## 2020-11-18 LAB
ALBUMIN SERPL-MCNC: 3.1 G/DL (ref 3.5–5.1)
ALP BLD-CCNC: 92 U/L (ref 38–126)
ALT SERPL-CCNC: 30 U/L (ref 11–66)
ANION GAP SERPL CALCULATED.3IONS-SCNC: 13 MEQ/L (ref 8–16)
AST SERPL-CCNC: 22 U/L (ref 5–40)
BASOPHILS # BLD: 0.4 %
BASOPHILS ABSOLUTE: 0 THOU/MM3 (ref 0–0.1)
BILIRUB SERPL-MCNC: 0.4 MG/DL (ref 0.3–1.2)
BUN BLDV-MCNC: 11 MG/DL (ref 7–22)
CALCIUM SERPL-MCNC: 8.9 MG/DL (ref 8.5–10.5)
CHLORIDE BLD-SCNC: 106 MEQ/L (ref 98–111)
CO2: 17 MEQ/L (ref 23–33)
CREAT SERPL-MCNC: 0.5 MG/DL (ref 0.4–1.2)
EOSINOPHIL # BLD: 0.2 %
EOSINOPHILS ABSOLUTE: 0 THOU/MM3 (ref 0–0.4)
ERYTHROCYTE [DISTWIDTH] IN BLOOD BY AUTOMATED COUNT: 14 % (ref 11.5–14.5)
ERYTHROCYTE [DISTWIDTH] IN BLOOD BY AUTOMATED COUNT: 46.4 FL (ref 35–45)
GFR SERPL CREATININE-BSD FRML MDRD: > 90 ML/MIN/1.73M2
GLUCOSE BLD-MCNC: 168 MG/DL (ref 70–108)
GLUCOSE BLD-MCNC: 199 MG/DL (ref 70–108)
GLUCOSE BLD-MCNC: 69 MG/DL (ref 70–108)
GLUCOSE BLD-MCNC: 90 MG/DL (ref 70–108)
HCT VFR BLD CALC: 38.1 % (ref 37–47)
HEMOGLOBIN: 12 GM/DL (ref 12–16)
IMMATURE GRANS (ABS): 0.15 THOU/MM3 (ref 0–0.07)
IMMATURE GRANULOCYTES: 3 %
LYMPHOCYTES # BLD: 20.5 %
LYMPHOCYTES ABSOLUTE: 1 THOU/MM3 (ref 1–4.8)
MCH RBC QN AUTO: 28.6 PG (ref 26–33)
MCHC RBC AUTO-ENTMCNC: 31.5 GM/DL (ref 32.2–35.5)
MCV RBC AUTO: 90.9 FL (ref 81–99)
MONOCYTES # BLD: 8 %
MONOCYTES ABSOLUTE: 0.4 THOU/MM3 (ref 0.4–1.3)
NUCLEATED RED BLOOD CELLS: 0 /100 WBC
PLATELET # BLD: 184 THOU/MM3 (ref 130–400)
PMV BLD AUTO: 10.9 FL (ref 9.4–12.4)
POTASSIUM REFLEX MAGNESIUM: 3.9 MEQ/L (ref 3.5–5.2)
RBC # BLD: 4.19 MILL/MM3 (ref 4.2–5.4)
SEG NEUTROPHILS: 67.9 %
SEGMENTED NEUTROPHILS ABSOLUTE COUNT: 3.4 THOU/MM3 (ref 1.8–7.7)
SODIUM BLD-SCNC: 136 MEQ/L (ref 135–145)
TOTAL PROTEIN: 6.5 G/DL (ref 6.1–8)
WBC # BLD: 5 THOU/MM3 (ref 4.8–10.8)

## 2020-11-18 PROCEDURE — 2500000003 HC RX 250 WO HCPCS: Performed by: INTERNAL MEDICINE

## 2020-11-18 PROCEDURE — 6370000000 HC RX 637 (ALT 250 FOR IP): Performed by: INTERNAL MEDICINE

## 2020-11-18 PROCEDURE — 6370000000 HC RX 637 (ALT 250 FOR IP): Performed by: NURSE PRACTITIONER

## 2020-11-18 PROCEDURE — 85025 COMPLETE CBC W/AUTO DIFF WBC: CPT

## 2020-11-18 PROCEDURE — 6360000002 HC RX W HCPCS: Performed by: INTERNAL MEDICINE

## 2020-11-18 PROCEDURE — 2580000003 HC RX 258: Performed by: INTERNAL MEDICINE

## 2020-11-18 PROCEDURE — 80053 COMPREHEN METABOLIC PANEL: CPT

## 2020-11-18 PROCEDURE — 36415 COLL VENOUS BLD VENIPUNCTURE: CPT

## 2020-11-18 PROCEDURE — 94761 N-INVAS EAR/PLS OXIMETRY MLT: CPT

## 2020-11-18 PROCEDURE — 2700000000 HC OXYGEN THERAPY PER DAY

## 2020-11-18 PROCEDURE — 2060000000 HC ICU INTERMEDIATE R&B

## 2020-11-18 PROCEDURE — 99232 SBSQ HOSP IP/OBS MODERATE 35: CPT | Performed by: INTERNAL MEDICINE

## 2020-11-18 PROCEDURE — 82948 REAGENT STRIP/BLOOD GLUCOSE: CPT

## 2020-11-18 RX ADMIN — ESCITALOPRAM 10 MG: 10 TABLET, FILM COATED ORAL at 10:14

## 2020-11-18 RX ADMIN — CETIRIZINE HYDROCHLORIDE 10 MG: 10 TABLET, FILM COATED ORAL at 10:14

## 2020-11-18 RX ADMIN — Medication 2000 UNITS: at 10:13

## 2020-11-18 RX ADMIN — ALOGLIPTIN 25 MG: 25 TABLET, FILM COATED ORAL at 10:13

## 2020-11-18 RX ADMIN — POTASSIUM CHLORIDE 10 MEQ: 750 TABLET, FILM COATED, EXTENDED RELEASE ORAL at 20:03

## 2020-11-18 RX ADMIN — HYDROXYZINE PAMOATE 25 MG: 25 CAPSULE ORAL at 16:31

## 2020-11-18 RX ADMIN — APIXABAN 5 MG: 5 TABLET, FILM COATED ORAL at 10:13

## 2020-11-18 RX ADMIN — FAMOTIDINE 20 MG: 20 TABLET, FILM COATED ORAL at 20:03

## 2020-11-18 RX ADMIN — Medication 1000 MCG: at 10:14

## 2020-11-18 RX ADMIN — POTASSIUM CHLORIDE 10 MEQ: 750 TABLET, FILM COATED, EXTENDED RELEASE ORAL at 10:13

## 2020-11-18 RX ADMIN — HYDROXYZINE PAMOATE 25 MG: 25 CAPSULE ORAL at 10:13

## 2020-11-18 RX ADMIN — CARIPRAZINE 1.5 MG: 1.5 CAPSULE, GELATIN COATED ORAL at 10:14

## 2020-11-18 RX ADMIN — FAMOTIDINE 20 MG: 20 TABLET, FILM COATED ORAL at 10:13

## 2020-11-18 RX ADMIN — TOPIRAMATE 100 MG: 100 TABLET, FILM COATED ORAL at 10:13

## 2020-11-18 RX ADMIN — ROSUVASTATIN CALCIUM 10 MG: 10 TABLET, FILM COATED ORAL at 20:03

## 2020-11-18 RX ADMIN — REMDESIVIR 100 MG: 100 INJECTION, POWDER, LYOPHILIZED, FOR SOLUTION INTRAVENOUS at 00:09

## 2020-11-18 RX ADMIN — TOPIRAMATE 100 MG: 100 TABLET, FILM COATED ORAL at 20:03

## 2020-11-18 RX ADMIN — DEXAMETHASONE 6 MG: 4 TABLET ORAL at 10:13

## 2020-11-18 RX ADMIN — Medication 50 MG: at 10:13

## 2020-11-18 RX ADMIN — APIXABAN 5 MG: 5 TABLET, FILM COATED ORAL at 20:03

## 2020-11-18 ASSESSMENT — PAIN SCALES - GENERAL: PAINLEVEL_OUTOF10: 0

## 2020-11-18 NOTE — CARE COORDINATION
DISASTER CHARTING    11/18/20, 2:13 PM EST    DISCHARGE ONGOING EVALUATION:     [facility] day: 5  Location: 6A-08/008-A Reason for admit: COVID-19 [U07.1]   Barriers to Discharge: Bi-pap-FiO2 at 40%, Eliquis, Decadron, DM management, Vitamin B12, D, and Zinc, incentive spirometry, telemetry. PCP: Noa Delatorre, DO  Patient Goals/Plan/Treatment Preferences: Sekou Castillo is from home with her . Oxygen at home, 5L through [facility].

## 2020-11-18 NOTE — PROGRESS NOTES
Per Dr. Tay Erickson , contact patient Pulmonologist to confirm medications for Pulmonary hypertension management. Attempted to call Dr. Robel Pineda office, no response. Will continue to try to contact.

## 2020-11-18 NOTE — PROGRESS NOTES
Hospitalist H+P      Patient:  Fly Wilcox    Unit/Bed:6A-08/008-A  YOB: 1960  MRN: [id number]   Acct: [de-identified]     PCP: Kole Forbes DO  Date of Admission: 11/13/2020        Assessment and Plan:        1. COVID-19 viral pneumonia: Continue with incentive spirometry vitamin C, D, zinc and Decadron and remdesivir  2. Acute on chronic hypoxic respiratory failure secondary to #1: History of pulmonary hypertension patient is on [place] 3 times daily, high flow nasal cannula weaning as tolerated  3. Hyponatremia hypovolemic resolved  4. Diabetes mellitus type 2 with hyperglycemia secondary to Decadron will continue coverage via insulin pump  5. History of DVT patient on Eliquis    CC: Shortness of breath, diarrhea, fever    HPI: 69-year-old female presents to [facility] with shortness of breath, fever, diarrhea. Patient has a past medical history of pulmonary hypertension related to chronic pulmonary emboli and is on pulmonary vasodilators. She is normally on 5 L of O2 at home and she desatted on that flow. Has a past medical history of diabetes, patient is an ex-smoker. ROS (14 point review of systems completed. Pertinent positives noted. Otherwise ROS is negative) : Fever shortness of breath diarrhea    PMH:  Per HPI and       Diagnosis Date    Arthritis     per pt    CHF (congestive heart failure) ([facility]     per pt    Hx of blood clots     per pt    Type II or unspecified type diabetes mellitus without mention of complication, not stated as uncontrolled     per pt     SHX:        Procedure Laterality Date    [address] [facility]    spine is fused due to scoliosis, since 1980 has had 4 other surgeries.     HYSTERECTOMY      LUMBAR DISC SURGERY  1990    TONSILLECTOMY      at [de-identified] years old     FHX:       Problem Relation Age of Onset    Diabetes Mother     Other Mother     Heart Disease Father     Diabetes Father     Other Father     Arthritis Sister    Ardyth Moritz Other Other      SOCHX:   Social History     Socioeconomic History    Marital status:      Spouse name: None    Number of children: None    Years of education: None    Highest education level: None   Occupational History    None   Social Needs    Financial resource strain: None    Food insecurity     Worry: None     Inability: None    Transportation needs     Medical: None     Non-medical: None   Tobacco Use    Smoking status: Former Smoker     Packs/day: 1.00     Years: 30.00     Pack years: 30.00     Last attempt to quit: 2015     Years since quittin.0    Smokeless tobacco: Never Used   Substance and Sexual Activity    Alcohol use:  Yes     Alcohol/week: 2.0 standard drinks     Types: 2 Glasses of wine per week    Drug use: None    Sexual activity: None   Lifestyle    Physical activity     Days per week: None     Minutes per session: None    Stress: None   Relationships    Social connections     Talks on phone: None     Gets together: None     Attends [employer] service: None     Active member of club or organization: None     Attends meetings of clubs or organizations: None     Relationship status: None    Intimate partner violence     Fear of current or ex partner: None     Emotionally abused: None     Physically abused: None     Forced sexual activity: None   Other Topics Concern    None   Social History Narrative    None      Allergies: Latex  Medications:     sodium chloride 50 mL/hr at 20 1514    dextrose        potassium replacement protocol   Other RX Placeholder    insulin lispro  0-12 Units Subcutaneous TID     insulin lispro  0-6 Units Subcutaneous Nightly    apixaban  5 mg Oral BID    cetirizine  10 mg Oral Daily    escitalopram  10 mg Oral Daily    rosuvastatin  10 mg Oral QPM    alogliptin  25 mg Oral Daily    dexamethasone  6 mg Oral Daily    Vitamin D  2,000 Units Oral Daily    zinc sulfate  50 mg Oral Daily    sodium chloride  20 mL appears stated age and cooperative. HEENT:  Normal cephalic, atraumatic without obvious deformity. Pupils equal, round, and reactive to light. Extra ocular muscles intact. Conjunctivae/corneas clear. Neck: Supple, with full range of motion. no jugular venous distention. Trachea midline. no carotid bruits  Respiratory:  Normal respiratory effort. Clear to auscultation, decreased breath sounds bilaterally especially in the lower lobes  Cardiovascular:  Regular rate and rhythm with normal S1/S2 without murmurs, rubs or gallops. PMI non displaced  Abdomen: Soft, non-tender, non-distended with normal bowel sounds. No guarding, rebound. Musculoskeletal:  No clubbing, cyanosis or edema bilaterally. Full range of motion without deformity. Skin: Skin color, texture, turgor normal.  No rashes or lesions, or suspicious lesions. Neurologic:  Neurovascularly intact without any focal sensory/motor deficits. Cranial nerves: II-XII intact, grossly non-focal.  Psychiatric:  Alert and oriented, thought content appropriate, normal insight  Capillary Refill: Brisk,< 2 seconds   Peripheral Pulses: +2 palpable, equal bilaterally upper and lower extremities  Lymphatics: no lymphadenopathy    Data: (All radiographs, tracings, PFTs, and imaging are personally viewed and interpreted unless otherwise noted).  Platelets 332   Potassium 3.9   Creatinine 0.5  Recent Labs     11/16/20  0614 11/17/20  0604 11/18/20  0535   WBC 6.4 4.9 5.0   HGB 12.6 11.6* 12.0   HCT 37.9 34.8* 38.1    181 184      Recent Labs     11/16/20  0614 11/17/20  0604 11/18/20  0535   * 135 136   K 2.9* 3.5 3.9    104 106   CO2 21* 20* 17*   BUN 14 12 11   CREATININE 0.6 0.7 0.5   CALCIUM 8.7 8.5 8.9      Recent Labs     11/16/20  0614 11/17/20  0604 11/18/20  0535   AST 37 22 22   ALT 40 31 30   BILITOT 0.4 0.4 0.4   ALKPHOS 95 94 92       No results for input(s): INR in the last 72 hours.    No results for input(s): Abby Starr in the last 72 hours. Radiology reports-mages reviewed in PACS  Xr Chest Portable    Result Date: 11/13/2020  PROCEDURE: XR CHEST PORTABLE CLINICAL INFORMATION: Cough SOB COMPARISON: No prior study. TECHNIQUE: A single mobile view of the chest was obtained. 1. Normal heart size. No effusion is seen. 2. Moderate groundglass infiltrates both mid and lower lung fields, consistent with pneumonia versus pulmonary edema. **This report has been created using voice recognition software. It may contain minor errors which are inherent in voice recognition technology. ** Final report electronically signed by Dr. Uri Morales on 11/13/2020 12:55 PM      Electronically signed by Clare Guzmán DO on 11/18/2020 at 12:35 PM

## 2020-11-19 LAB
ALBUMIN SERPL-MCNC: 3 G/DL (ref 3.5–5.1)
ALP BLD-CCNC: 95 U/L (ref 38–126)
ALT SERPL-CCNC: 34 U/L (ref 11–66)
ANION GAP SERPL CALCULATED.3IONS-SCNC: 8 MEQ/L (ref 8–16)
AST SERPL-CCNC: 27 U/L (ref 5–40)
BASOPHILS # BLD: 0.5 %
BASOPHILS ABSOLUTE: 0 THOU/MM3 (ref 0–0.1)
BILIRUB SERPL-MCNC: 0.5 MG/DL (ref 0.3–1.2)
BLOOD CULTURE, ROUTINE: NORMAL
BLOOD CULTURE, ROUTINE: NORMAL
BUN BLDV-MCNC: 12 MG/DL (ref 7–22)
CALCIUM SERPL-MCNC: 8.9 MG/DL (ref 8.5–10.5)
CHLORIDE BLD-SCNC: 105 MEQ/L (ref 98–111)
CO2: 21 MEQ/L (ref 23–33)
CREAT SERPL-MCNC: 0.6 MG/DL (ref 0.4–1.2)
EOSINOPHIL # BLD: 0.3 %
EOSINOPHILS ABSOLUTE: 0 THOU/MM3 (ref 0–0.4)
ERYTHROCYTE [DISTWIDTH] IN BLOOD BY AUTOMATED COUNT: 14.1 % (ref 11.5–14.5)
ERYTHROCYTE [DISTWIDTH] IN BLOOD BY AUTOMATED COUNT: 44.8 FL (ref 35–45)
GFR SERPL CREATININE-BSD FRML MDRD: > 90 ML/MIN/1.73M2
GLUCOSE BLD-MCNC: 105 MG/DL (ref 70–108)
GLUCOSE BLD-MCNC: 113 MG/DL (ref 70–108)
GLUCOSE BLD-MCNC: 192 MG/DL (ref 70–108)
GLUCOSE BLD-MCNC: 193 MG/DL (ref 70–108)
GLUCOSE BLD-MCNC: 222 MG/DL (ref 70–108)
HCT VFR BLD CALC: 36.5 % (ref 37–47)
HEMOGLOBIN: 12.2 GM/DL (ref 12–16)
IMMATURE GRANS (ABS): 0.26 THOU/MM3 (ref 0–0.07)
IMMATURE GRANULOCYTES: 4 %
LYMPHOCYTES # BLD: 16.6 %
LYMPHOCYTES ABSOLUTE: 1.1 THOU/MM3 (ref 1–4.8)
MCH RBC QN AUTO: 29.6 PG (ref 26–33)
MCHC RBC AUTO-ENTMCNC: 33.4 GM/DL (ref 32.2–35.5)
MCV RBC AUTO: 88.6 FL (ref 81–99)
MONOCYTES # BLD: 6.6 %
MONOCYTES ABSOLUTE: 0.4 THOU/MM3 (ref 0.4–1.3)
NUCLEATED RED BLOOD CELLS: 0 /100 WBC
PLATELET # BLD: 195 THOU/MM3 (ref 130–400)
PMV BLD AUTO: 10.9 FL (ref 9.4–12.4)
POTASSIUM REFLEX MAGNESIUM: 4.4 MEQ/L (ref 3.5–5.2)
RBC # BLD: 4.12 MILL/MM3 (ref 4.2–5.4)
SEG NEUTROPHILS: 72 %
SEGMENTED NEUTROPHILS ABSOLUTE COUNT: 4.7 THOU/MM3 (ref 1.8–7.7)
SODIUM BLD-SCNC: 134 MEQ/L (ref 135–145)
TOTAL PROTEIN: 6.1 G/DL (ref 6.1–8)
WBC # BLD: 6.5 THOU/MM3 (ref 4.8–10.8)

## 2020-11-19 PROCEDURE — 6370000000 HC RX 637 (ALT 250 FOR IP): Performed by: INTERNAL MEDICINE

## 2020-11-19 PROCEDURE — 6370000000 HC RX 637 (ALT 250 FOR IP): Performed by: NURSE PRACTITIONER

## 2020-11-19 PROCEDURE — 2580000003 HC RX 258: Performed by: NURSE PRACTITIONER

## 2020-11-19 PROCEDURE — 94761 N-INVAS EAR/PLS OXIMETRY MLT: CPT

## 2020-11-19 PROCEDURE — 2060000000 HC ICU INTERMEDIATE R&B

## 2020-11-19 PROCEDURE — 80053 COMPREHEN METABOLIC PANEL: CPT

## 2020-11-19 PROCEDURE — 99232 SBSQ HOSP IP/OBS MODERATE 35: CPT | Performed by: INTERNAL MEDICINE

## 2020-11-19 PROCEDURE — 82948 REAGENT STRIP/BLOOD GLUCOSE: CPT

## 2020-11-19 PROCEDURE — 85025 COMPLETE CBC W/AUTO DIFF WBC: CPT

## 2020-11-19 PROCEDURE — 6360000002 HC RX W HCPCS: Performed by: INTERNAL MEDICINE

## 2020-11-19 PROCEDURE — 36415 COLL VENOUS BLD VENIPUNCTURE: CPT

## 2020-11-19 RX ORDER — LORAZEPAM 0.5 MG/1
0.5 TABLET ORAL EVERY 8 HOURS PRN
COMMUNITY
Start: 2020-09-14

## 2020-11-19 RX ORDER — LORAZEPAM 0.5 MG/1
0.5 TABLET ORAL EVERY 8 HOURS PRN
Status: DISCONTINUED | OUTPATIENT
Start: 2020-11-19 | End: 2020-11-20 | Stop reason: HOSPADM

## 2020-11-19 RX ADMIN — FAMOTIDINE 20 MG: 20 TABLET, FILM COATED ORAL at 20:14

## 2020-11-19 RX ADMIN — APIXABAN 5 MG: 5 TABLET, FILM COATED ORAL at 20:14

## 2020-11-19 RX ADMIN — ALOGLIPTIN 25 MG: 25 TABLET, FILM COATED ORAL at 10:00

## 2020-11-19 RX ADMIN — Medication 50 MG: at 10:00

## 2020-11-19 RX ADMIN — LOPERAMIDE HYDROCHLORIDE 2 MG: 2 CAPSULE ORAL at 17:08

## 2020-11-19 RX ADMIN — ROSUVASTATIN CALCIUM 10 MG: 10 TABLET, FILM COATED ORAL at 17:07

## 2020-11-19 RX ADMIN — SODIUM CHLORIDE: 9 INJECTION, SOLUTION INTRAVENOUS at 04:40

## 2020-11-19 RX ADMIN — CARIPRAZINE 1.5 MG: 1.5 CAPSULE, GELATIN COATED ORAL at 10:00

## 2020-11-19 RX ADMIN — Medication 1000 MCG: at 10:01

## 2020-11-19 RX ADMIN — LORAZEPAM 0.5 MG: 0.5 TABLET ORAL at 17:07

## 2020-11-19 RX ADMIN — POTASSIUM CHLORIDE 10 MEQ: 750 TABLET, FILM COATED, EXTENDED RELEASE ORAL at 08:00

## 2020-11-19 RX ADMIN — APIXABAN 5 MG: 5 TABLET, FILM COATED ORAL at 09:59

## 2020-11-19 RX ADMIN — TOPIRAMATE 100 MG: 100 TABLET, FILM COATED ORAL at 10:00

## 2020-11-19 RX ADMIN — POTASSIUM CHLORIDE 10 MEQ: 750 TABLET, FILM COATED, EXTENDED RELEASE ORAL at 20:14

## 2020-11-19 RX ADMIN — CETIRIZINE HYDROCHLORIDE 10 MG: 10 TABLET, FILM COATED ORAL at 10:01

## 2020-11-19 RX ADMIN — FAMOTIDINE 20 MG: 20 TABLET, FILM COATED ORAL at 13:38

## 2020-11-19 RX ADMIN — HYDROXYZINE PAMOATE 25 MG: 25 CAPSULE ORAL at 00:47

## 2020-11-19 RX ADMIN — DEXAMETHASONE 6 MG: 4 TABLET ORAL at 10:00

## 2020-11-19 RX ADMIN — Medication 2000 UNITS: at 10:01

## 2020-11-19 RX ADMIN — ESCITALOPRAM 10 MG: 10 TABLET, FILM COATED ORAL at 10:01

## 2020-11-19 ASSESSMENT — PAIN SCALES - GENERAL
PAINLEVEL_OUTOF10: 0
PAINLEVEL_OUTOF10: 0

## 2020-11-19 NOTE — FLOWSHEET NOTE
11/19/20 0640   Family/Significant Other Communication   Reason Update   Name Kash Del Cid   Relationship Spouse/Significant Other   Response Concerned   Method of Communication Phone     Pt  concerned about wife being retested before being discharged to home.

## 2020-11-19 NOTE — PROGRESS NOTES
Hospitalist      Patient:  Jourdan Sheikh    Unit/Bed:6A-08/008-A  YOB: 1960  MRN: [id number]   Acct: [de-identified]     PCP: Sergey Castro DO  Date of Admission: 11/13/2020        Assessment and Plan:        1. COVID-19 viral pneumonia: Continue with incentive spirometry vitamin C, D, zinc and Decadron and remdesivir  2. Acute on chronic hypoxic respiratory failure secondary to #1: History of pulmonary hypertension patient is on Adempas 3 times daily, high flow nasal cannula weaning as tolerated  3. Hyponatremia hypovolemic resolved  4. Diabetes mellitus type 2 with hyperglycemia secondary to Decadron will continue coverage via insulin pump  5. History of DVT patient on Eliquis    Events noted in the last 24 hours: Respiratory is gradually decreased her FiO2 patient currently on 40 with a flow rate of 35, patient being considered for possible transfer to [place]. CC: Shortness of breath, diarrhea, fever    HPI: 58-year-old female presents to [facility] with shortness of breath, fever, diarrhea. Patient has a past medical history of pulmonary hypertension related to chronic pulmonary emboli and is on pulmonary vasodilators. She is normally on 5 L of O2 at home and she desatted on that flow. Has a past medical history of diabetes, patient is an ex-smoker. ROS (14 point review of systems completed. Pertinent positives noted. Otherwise ROS is negative) : Fever shortness of breath diarrhea    PMH:  Per HPI and       Diagnosis Date    Arthritis     per pt    CHF (congestive heart failure) ([address]     per pt    Hx of blood clots     per pt    Type II or unspecified type diabetes mellitus without mention of complication, not stated as uncontrolled     per pt     SHX:        Procedure Laterality Date    [facility]    spine is fused due to scoliosis, since 1980 has had 4 other surgeries.    [address] [place] [address] SURGERY  1990    TONSILLECTOMY      at [facility] years old     FHX:       Problem Relation Age of Onset    Diabetes Mother     Other Mother     Heart Disease Father     Diabetes Father     Other Father     Arthritis Sister     Other Other      SOCHX:   Social History     Socioeconomic History    Marital status:      Spouse name: None    Number of children: None    Years of education: None    Highest education level: None   Occupational History    None   Social Needs    Financial resource strain: None    Food insecurity     Worry: None     Inability: None    Transportation needs     Medical: None     Non-medical: None   Tobacco Use    Smoking status: Former Smoker     Packs/day: 1.00     Years: 30.00     Pack years: 30.00     Last attempt to quit: 2015     Years since quittin.0    Smokeless tobacco: Never Used   Substance and Sexual Activity    Alcohol use:  Yes     Alcohol/week: 2.0 standard drinks     Types: 2 Glasses of wine per week    Drug use: None    Sexual activity: None   Lifestyle    Physical activity     Days per week: None     Minutes per session: None    Stress: None   Relationships    Social connections     Talks on phone: None     Gets together: None     Attends Hindu service: None     Active member of club or organization: None     Attends meetings of clubs or organizations: None     Relationship status: None    Intimate partner violence     Fear of current or ex partner: None     Emotionally abused: None     Physically abused: None     Forced sexual activity: None   Other Topics Concern    None   Social History Narrative    None      Allergies: Latex  Medications:     sodium chloride 50 mL/hr at 20 0440    dextrose        potassium replacement protocol   Other RX Placeholder    insulin lispro  0-12 Units Subcutaneous TID WC    insulin lispro  0-6 Units Subcutaneous Nightly    apixaban  5 mg Oral BID    cetirizine  10 mg Oral Daily    escitalopram  10 mg Oral Daily    rosuvastatin  10 mg Oral QPM    alogliptin  25 mg Oral Daily    dexamethasone  6 mg Oral Daily    Vitamin D  2,000 Units Oral Daily    zinc sulfate  50 mg Oral Daily    sodium chloride  20 mL Intravenous Once    cariprazine hcl  1.5 mg Oral Daily    famotidine  20 mg Oral BID    potassium chloride  10 mEq Oral BID    Riociguat  2.5 mg Oral TID    topiramate  100 mg Oral BID    vitamin B-12  1,000 mcg Oral Daily     Prior to Admission medications    Medication Sig Start Date End Date Taking?  Authorizing Provider   Riociguat (ADEMPAS) 2.5 MG TABS Take 2.5 mg by mouth 3 times daily   Yes Historical Provider, MD   torsemide (DEMADEX) 100 MG tablet Take 200 mg by mouth daily   Yes Historical Provider, MD   spironolactone (ALDACTONE) 100 MG tablet Take 200 mg by mouth daily   Yes Historical Provider, MD   famotidine (PEPCID) 20 MG tablet Take 20 mg by mouth 2 times daily   Yes Historical Provider, MD   apixaban (ELIQUIS) 5 MG TABS tablet Take 5 mg by mouth 2 times daily   Yes Historical Provider, MD   Biotin 91955 MCG TABS Take 10,000 mcg by mouth   Yes Historical Provider, MD   vitamin B-12 (CYANOCOBALAMIN) 1000 MCG tablet Take 1,000 mcg by mouth daily   Yes Historical Provider, MD   cariprazine hcl (VRAYLAR) 1.5 MG capsule Take 1.5 mg by mouth daily   Yes Historical Provider, MD   topiramate (TOPAMAX) 100 MG tablet Take 100 mg by mouth 2 times daily   Yes Historical Provider, MD   potassium chloride (MICRO-K) 10 MEQ extended release capsule Take 10 mEq by mouth 2 times daily   Yes Historical Provider, MD   rosuvastatin (CRESTOR) 10 MG tablet   Take 10 mg by mouth every evening    Yes Historical Provider, MD   sitaGLIPtin (JANUVIA) 100 MG tablet   Take 100 mg by mouth every evening    Yes Historical Provider, MD   escitalopram (LEXAPRO) 10 MG tablet Take 10 mg by mouth daily   Yes Historical Provider, MD   cetirizine (ZYRTEC ALLERGY) 10 MG tablet Take 10 mg by mouth daily   Yes Historical Provider, MD      PHYSICAL EXAM:    BP (!) 135/46   Pulse 81   Temp 98.2 °F (36.8 °C) (Oral)   Resp 24   Wt 260 lb (117.9 kg)   SpO2 95%   BMI 44.63 kg/m²     General appearance:  No apparent distress, appears stated age and cooperative. HEENT:  Normal cephalic, atraumatic without obvious deformity. Pupils equal, round, and reactive to light. Extra ocular muscles intact. Conjunctivae/corneas clear. Neck: Supple, with full range of motion. no jugular venous distention. Trachea midline. no carotid bruits  Respiratory:  Normal respiratory effort. Clear to auscultation, decreased breath sounds bilaterally especially in the lower lobes  Cardiovascular:  Regular rate and rhythm with normal S1/S2 without murmurs, rubs or gallops. PMI non displaced  Abdomen: Soft, non-tender, non-distended with normal bowel sounds. No guarding, rebound. Musculoskeletal:  No clubbing, cyanosis or edema bilaterally. Full range of motion without deformity. Skin: Skin color, texture, turgor normal.  No rashes or lesions, or suspicious lesions. Neurologic:  Neurovascularly intact without any focal sensory/motor deficits. Cranial nerves: II-XII intact, grossly non-focal.  Psychiatric:  Alert and oriented, thought content appropriate, normal insight  Capillary Refill: Brisk,< 2 seconds   Peripheral Pulses: +2 palpable, equal bilaterally upper and lower extremities  Lymphatics: no lymphadenopathy    Data: (All radiographs, tracings, PFTs, and imaging are personally viewed and interpreted unless otherwise noted).     Platelets 045   Potassium 3.9   Creatinine 0.5  Recent Labs     11/17/20  0604 11/18/20  0535 11/19/20  0551   WBC 4.9 5.0 6.5   HGB 11.6* 12.0 12.2   HCT 34.8* 38.1 36.5*    184 195     Recent Labs     11/17/20  0604 11/18/20  0535 11/19/20  0551    136 134*   K 3.5 3.9 4.4    106 105   CO2 20* 17* 21*   BUN 12 11 12   CREATININE 0.7 0.5 0.6   CALCIUM 8.5 8.9 8.9     Recent Labs     11/17/20  0604 11/18/20  0535 11/19/20  0551   AST 22 22 27 ALT 31 30 34   BILITOT 0.4 0.4 0.5   ALKPHOS 94 92 95     No results for input(s): INR in the last 72 hours. No results for input(s): Amanda Solo in the last 72 hours. Radiology reports-mages reviewed in PACS  Xr Chest Portable    Result Date: 11/13/2020  PROCEDURE: XR CHEST PORTABLE CLINICAL INFORMATION: Cough SOB COMPARISON: No prior study. TECHNIQUE: A single mobile view of the chest was obtained. 1. Normal heart size. No effusion is seen. 2. Moderate groundglass infiltrates both mid and lower lung fields, consistent with pneumonia versus pulmonary edema. **This report has been created using voice recognition software. It may contain minor errors which are inherent in voice recognition technology. ** Final report electronically signed by Dr. Solomon Vernon on 11/13/2020 12:55 PM      Electronically signed by Ana Rosa Santoyo DO on 11/19/2020 at 2:07 PM

## 2020-11-19 NOTE — CARE COORDINATION
DISASTER CHARTING    11/19/20, 10:10 AM EST    DISCHARGE ONGOING EVALUATION:     [facility] day: 6  Location: 6A-08/008-A Reason for admit: COVID-19 [U07.1]   Barriers to Discharge: remains on HHF oxygen 40/40, sats 93%, resps 21-27/min, no fevers  PCP: Gian Couching, DO  Patient Goals/Plan/Treatment Preferences: from home with spouse; has home oxygen thru [employer] at 5L/min; [facility]ana QL today per Dr Amada Turner. Called and notified Elgin Ferrell; will follow. 11:39 am- called  Sharyle Batter re: possible [facility] admission. No answer, left message.  called back. He shares she has a pulmonary physician in [place] who has completed multiple procedures on Dormify. He would be in agreement with Dormify going to [employer]. He suggested to speak with his wife and get her approval also. - attempting to reach patient - line busy  Left 2 messages on pt cell phone. 1425 pm- patient up in chair, spoke sapna and she agrees to [place]. Updated Betzaida Hobson.

## 2020-11-20 VITALS
HEART RATE: 78 BPM | OXYGEN SATURATION: 96 % | DIASTOLIC BLOOD PRESSURE: 56 MMHG | TEMPERATURE: 98.4 F | SYSTOLIC BLOOD PRESSURE: 130 MMHG | RESPIRATION RATE: 24 BRPM | BODY MASS INDEX: 47.31 KG/M2 | WEIGHT: 275.6 LBS

## 2020-11-20 LAB
ALBUMIN SERPL-MCNC: 3.3 G/DL (ref 3.5–5.1)
ALP BLD-CCNC: 87 U/L (ref 38–126)
ALT SERPL-CCNC: 34 U/L (ref 11–66)
ANION GAP SERPL CALCULATED.3IONS-SCNC: 11 MEQ/L (ref 8–16)
AST SERPL-CCNC: 27 U/L (ref 5–40)
ATYPICAL LYMPHOCYTES: ABNORMAL %
BASOPHILIA: ABNORMAL
BASOPHILS # BLD: 0.5 %
BASOPHILS ABSOLUTE: 0 THOU/MM3 (ref 0–0.1)
BILIRUB SERPL-MCNC: 0.5 MG/DL (ref 0.3–1.2)
BUN BLDV-MCNC: 11 MG/DL (ref 7–22)
CALCIUM SERPL-MCNC: 8.5 MG/DL (ref 8.5–10.5)
CHLORIDE BLD-SCNC: 105 MEQ/L (ref 98–111)
CO2: 20 MEQ/L (ref 23–33)
CREAT SERPL-MCNC: 0.6 MG/DL (ref 0.4–1.2)
CRENATED RBC'S: ABNORMAL
EOSINOPHIL # BLD: 0.2 %
EOSINOPHILS ABSOLUTE: 0 THOU/MM3 (ref 0–0.4)
ERYTHROCYTE [DISTWIDTH] IN BLOOD BY AUTOMATED COUNT: 14.2 % (ref 11.5–14.5)
ERYTHROCYTE [DISTWIDTH] IN BLOOD BY AUTOMATED COUNT: 45.2 FL (ref 35–45)
GFR SERPL CREATININE-BSD FRML MDRD: > 90 ML/MIN/1.73M2
GLUCOSE BLD-MCNC: 109 MG/DL (ref 70–108)
GLUCOSE BLD-MCNC: 110 MG/DL (ref 70–108)
GLUCOSE BLD-MCNC: 57 MG/DL (ref 70–108)
GLUCOSE BLD-MCNC: 71 MG/DL (ref 70–108)
HCT VFR BLD CALC: 36.3 % (ref 37–47)
HEMOGLOBIN: 12.2 GM/DL (ref 12–16)
IMMATURE GRANS (ABS): 0.36 THOU/MM3 (ref 0–0.07)
IMMATURE GRANULOCYTES: 4.4 %
LYMPHOCYTES # BLD: 15.3 %
LYMPHOCYTES ABSOLUTE: 1.2 THOU/MM3 (ref 1–4.8)
MCH RBC QN AUTO: 29.5 PG (ref 26–33)
MCHC RBC AUTO-ENTMCNC: 33.6 GM/DL (ref 32.2–35.5)
MCV RBC AUTO: 87.7 FL (ref 81–99)
MONOCYTES # BLD: 6.5 %
MONOCYTES ABSOLUTE: 0.5 THOU/MM3 (ref 0.4–1.3)
NUCLEATED RED BLOOD CELLS: 0 /100 WBC
PATHOLOGIST REVIEW: ABNORMAL
PLATELET # BLD: 194 THOU/MM3 (ref 130–400)
PLATELET ESTIMATE: ADEQUATE
PMV BLD AUTO: 10.6 FL (ref 9.4–12.4)
POIKILOCYTES: ABNORMAL
POTASSIUM REFLEX MAGNESIUM: 3.9 MEQ/L (ref 3.5–5.2)
RBC # BLD: 4.14 MILL/MM3 (ref 4.2–5.4)
SCAN OF BLOOD SMEAR: NORMAL
SEG NEUTROPHILS: 73.1 %
SEGMENTED NEUTROPHILS ABSOLUTE COUNT: 5.9 THOU/MM3 (ref 1.8–7.7)
SODIUM BLD-SCNC: 136 MEQ/L (ref 135–145)
TOTAL PROTEIN: 5.6 G/DL (ref 6.1–8)
WBC # BLD: 8.1 THOU/MM3 (ref 4.8–10.8)

## 2020-11-20 PROCEDURE — 2580000003 HC RX 258: Performed by: NURSE PRACTITIONER

## 2020-11-20 PROCEDURE — 85025 COMPLETE CBC W/AUTO DIFF WBC: CPT

## 2020-11-20 PROCEDURE — 2700000000 HC OXYGEN THERAPY PER DAY

## 2020-11-20 PROCEDURE — 6360000002 HC RX W HCPCS: Performed by: INTERNAL MEDICINE

## 2020-11-20 PROCEDURE — 36415 COLL VENOUS BLD VENIPUNCTURE: CPT

## 2020-11-20 PROCEDURE — 82948 REAGENT STRIP/BLOOD GLUCOSE: CPT

## 2020-11-20 PROCEDURE — 94761 N-INVAS EAR/PLS OXIMETRY MLT: CPT

## 2020-11-20 PROCEDURE — 99239 HOSP IP/OBS DSCHRG MGMT >30: CPT | Performed by: INTERNAL MEDICINE

## 2020-11-20 PROCEDURE — 6370000000 HC RX 637 (ALT 250 FOR IP): Performed by: NURSE PRACTITIONER

## 2020-11-20 PROCEDURE — 6370000000 HC RX 637 (ALT 250 FOR IP): Performed by: INTERNAL MEDICINE

## 2020-11-20 PROCEDURE — 80053 COMPREHEN METABOLIC PANEL: CPT

## 2020-11-20 RX ORDER — DEXAMETHASONE 6 MG/1
6 TABLET ORAL DAILY
Qty: 10 TABLET | Refills: 0 | Status: SHIPPED | OUTPATIENT
Start: 2020-11-21 | End: 2020-12-01

## 2020-11-20 RX ORDER — TREPROSTINIL 10 MG/ML
39 INJECTION, SOLUTION INTRAVENOUS; SUBCUTANEOUS CONTINUOUS
Qty: 100 ML | Refills: 0
Start: 2020-11-20

## 2020-11-20 RX ORDER — TREPROSTINIL 10 MG/ML
39 INJECTION, SOLUTION INTRAVENOUS; SUBCUTANEOUS CONTINUOUS
Status: DISCONTINUED | OUTPATIENT
Start: 2020-11-20 | End: 2020-11-20 | Stop reason: HOSPADM

## 2020-11-20 RX ORDER — CHOLECALCIFEROL (VITAMIN D3) 50 MCG
2000 TABLET ORAL DAILY
Qty: 60 TABLET | Refills: 0 | Status: SHIPPED | OUTPATIENT
Start: 2020-11-21

## 2020-11-20 RX ORDER — ZINC SULFATE 50(220)MG
50 CAPSULE ORAL DAILY
Qty: 30 CAPSULE | Refills: 3 | COMMUNITY
Start: 2020-11-21

## 2020-11-20 RX ADMIN — CETIRIZINE HYDROCHLORIDE 10 MG: 10 TABLET, FILM COATED ORAL at 08:46

## 2020-11-20 RX ADMIN — ALOGLIPTIN 25 MG: 25 TABLET, FILM COATED ORAL at 08:46

## 2020-11-20 RX ADMIN — CARIPRAZINE 1.5 MG: 1.5 CAPSULE, GELATIN COATED ORAL at 08:46

## 2020-11-20 RX ADMIN — TOPIRAMATE 100 MG: 100 TABLET, FILM COATED ORAL at 08:46

## 2020-11-20 RX ADMIN — DEXAMETHASONE 6 MG: 4 TABLET ORAL at 08:48

## 2020-11-20 RX ADMIN — POTASSIUM CHLORIDE 10 MEQ: 750 TABLET, FILM COATED, EXTENDED RELEASE ORAL at 08:46

## 2020-11-20 RX ADMIN — Medication 50 MG: at 08:49

## 2020-11-20 RX ADMIN — LORAZEPAM 0.5 MG: 0.5 TABLET ORAL at 14:26

## 2020-11-20 RX ADMIN — Medication 1000 MCG: at 08:49

## 2020-11-20 RX ADMIN — ESCITALOPRAM 10 MG: 10 TABLET, FILM COATED ORAL at 08:46

## 2020-11-20 RX ADMIN — Medication 2000 UNITS: at 08:49

## 2020-11-20 RX ADMIN — SODIUM CHLORIDE: 9 INJECTION, SOLUTION INTRAVENOUS at 04:04

## 2020-11-20 RX ADMIN — APIXABAN 5 MG: 5 TABLET, FILM COATED ORAL at 08:46

## 2020-11-20 RX ADMIN — FAMOTIDINE 20 MG: 20 TABLET, FILM COATED ORAL at 08:46

## 2020-11-20 NOTE — PROGRESS NOTES
Hospitalist      Patient:  Bette Carlisle    Unit/Bed:6A-08/008-A  YOB: 1960  MRN: [id number]   Acct: [de-identified]     PCP: Sanam Mullins DO  Date of Admission: 11/13/2020        Assessment and Plan:        1. COVID-19 viral pneumonia: Continue with incentive spirometry vitamin C, D, zinc and Decadron and remdesivir  2. Acute on chronic hypoxic respiratory failure secondary to #1: History of pulmonary hypertension patient is on Adempas 3 times daily, high flow nasal cannula weaning as tolerated  3. Hyponatremia hypovolemic resolved  4. Diabetes mellitus type 2 with hyperglycemia secondary to Decadron will continue coverage via insulin pump  5. History of DVT patient on Eliquis    Events noted in the last 24 hours: Respiratory is gradually decreased her FiO2 patient currently on 40 with a flow rate of 35, patient being considered for possible transfer to [place]. 11/20/2020: Patient has completed her 5 doses of remdesivir, she has been accepted to [place], will transfer to [place]    CC: Shortness of breath, diarrhea, fever    HPI: 29-year-old female presents to [facility] with shortness of breath, fever, diarrhea. Patient has a past medical history of pulmonary hypertension related to chronic pulmonary emboli and is on pulmonary vasodilators. She is normally on 5 L of O2 at home and she desatted on that flow. Has a past medical history of diabetes, patient is an ex-smoker. ROS (14 point review of systems completed. Pertinent positives noted.  Otherwise ROS is negative) : Fever shortness of breath diarrhea    PMH:  Per HPI and       Diagnosis Date    Arthritis     per pt    CHF (congestive heart failure) (HCC)     per pt    Hx of blood clots     per pt    Type II or unspecified type diabetes mellitus without mention of complication, not stated as uncontrolled     per pt     SHX:        Procedure Laterality Date   [address]    spine is fused due to scoliosis, since  has had 4 other surgeries.  HYSTERECTOMY      LUMBAR DISC SURGERY      TONSILLECTOMY      at [de-identified] years old     FHX:       Problem Relation Age of Onset    Diabetes Mother     Other Mother     Heart Disease Father     Diabetes Father     Other Father     Arthritis Sister     Other Other      SOCHX:   Social History     Socioeconomic History    Marital status:      Spouse name: None    Number of children: None    Years of education: None    Highest education level: None   Occupational History    None   Social Needs    Financial resource strain: None    Food insecurity     Worry: None     Inability: None    Transportation needs     Medical: None     Non-medical: None   Tobacco Use    Smoking status: Former Smoker     Packs/day: 1.00     Years: 30.00     Pack years: 30.00     Last attempt to quit: 2015     Years since quittin.0    Smokeless tobacco: Never Used   Substance and Sexual Activity    Alcohol use:  Yes     Alcohol/week: 2.0 standard drinks     Types: 2 Glasses of wine per week    Drug use: None    Sexual activity: None   Lifestyle    Physical activity     Days per week: None     Minutes per session: None    Stress: None   Relationships    Social connections     Talks on phone: None     Gets together: None     Attends Sikh service: None     Active member of club or organization: None     Attends meetings of clubs or organizations: None     Relationship status: None    Intimate partner violence     Fear of current or ex partner: None     Emotionally abused: None     Physically abused: None     Forced sexual activity: None   Other Topics Concern    None   Social History Narrative    None      Allergies: Latex  Medications:     treprostinil      sodium chloride 50 mL/hr at 20 0404    dextrose        potassium replacement protocol   Other RX Placeholder    insulin lispro  0-12 Units Subcutaneous TID WC    insulin lispro  0-6 (JANUVIA) 100 MG tablet   Take 100 mg by mouth every evening    Yes Historical Provider, MD   escitalopram (LEXAPRO) 10 MG tablet Take 10 mg by mouth daily   Yes Historical Provider, MD   cetirizine (ZYRTEC ALLERGY) 10 MG tablet Take 10 mg by mouth daily   Yes Historical Provider, MD      PHYSICAL EXAM:    BP (!) 130/56   Pulse 78   Temp 98.4 °F (36.9 °C) (Oral)   Resp 23   Wt 275 lb 9.6 oz (125 kg)   SpO2 95%   BMI 47.31 kg/m²     General appearance:  No apparent distress, appears stated age and cooperative. HEENT:  Normal cephalic, atraumatic without obvious deformity. Pupils equal, round, and reactive to light. Extra ocular muscles intact. Conjunctivae/corneas clear. Neck: Supple, with full range of motion. no jugular venous distention. Trachea midline. no carotid bruits  Respiratory:  Normal respiratory effort. Clear to auscultation, decreased breath sounds bilaterally especially in the lower lobes  Cardiovascular:  Regular rate and rhythm with normal S1/S2 without murmurs, rubs or gallops. PMI non displaced  Abdomen: Soft, non-tender, non-distended with normal bowel sounds. No guarding, rebound. Musculoskeletal:  No clubbing, cyanosis or edema bilaterally. Full range of motion without deformity. Skin: Skin color, texture, turgor normal.  No rashes or lesions, or suspicious lesions. Neurologic:  Neurovascularly intact without any focal sensory/motor deficits. Cranial nerves: II-XII intact, grossly non-focal.  Psychiatric:  Alert and oriented, thought content appropriate, normal insight  Capillary Refill: Brisk,< 2 seconds   Peripheral Pulses: +2 palpable, equal bilaterally upper and lower extremities  Lymphatics: no lymphadenopathy    Data: (All radiographs, tracings, PFTs, and imaging are personally viewed and interpreted unless otherwise noted).     Platelets 069   Potassium 3.9   Creatinine 0.5  Recent Labs     11/18/20  0535 11/19/20  0551 11/20/20  0624   WBC 5.0 6.5 8.1   HGB 12.0 12.2 12.2 HCT 38.1 36.5* 36.3*    195 194     Recent Labs     11/18/20  0535 11/19/20  0551 11/20/20  0624    134* 136   K 3.9 4.4 3.9    105 105   CO2 17* 21* 20*   BUN 11 12 11   CREATININE 0.5 0.6 0.6   CALCIUM 8.9 8.9 8.5     Recent Labs     11/18/20  0535 11/19/20  0551 11/20/20  0624   AST 22 27 27   ALT 30 34 34   BILITOT 0.4 0.5 0.5   ALKPHOS 92 95 87     No results for input(s): INR in the last 72 hours. No results for input(s): Jabier Altes in the last 72 hours. Radiology reports-mages reviewed in PACS  Xr Chest Portable    Result Date: 11/13/2020  PROCEDURE: XR CHEST PORTABLE CLINICAL INFORMATION: Cough SOB COMPARISON: No prior study. TECHNIQUE: A single mobile view of the chest was obtained. 1. Normal heart size. No effusion is seen. 2. Moderate groundglass infiltrates both mid and lower lung fields, consistent with pneumonia versus pulmonary edema. **This report has been created using voice recognition software. It may contain minor errors which are inherent in voice recognition technology. ** Final report electronically signed by Dr. Joce Callahan on 11/13/2020 12:55 PM      Electronically signed by Maxine Rasmussen DO on 11/20/2020 at 2:21 PM

## 2020-11-20 NOTE — PROGRESS NOTES
Report called to [place]. Patient transferred to [place] on 100% non-rebreather and personal belongings.

## 2020-11-20 NOTE — CARE COORDINATION
11/20/20, 2:51 PM EST    Discharged to [facility] room 17 - see NN for specifics. Collaborated with Ferol Spatz and [place] [id number] [facility]. Patient goals/plan/ treatment preferences discussed by  and . Patient goals/plan/ treatment preferences reviewed with patient/ family. Patient/ family verbalize understanding of discharge plan and are in agreement with goal/plan/treatment preferences. Understanding was demonstrated using the teach back method. AVS provided by RN at time of discharge, which includes all necessary medical information pertaining to the patients current course of illness, treatment, post-discharge goals of care, and treatment preferences.

## 2020-11-20 NOTE — PROGRESS NOTES
Spoke with , Gianni Crews, and provided update on patient's status throughout evening. Gianni Crews has no further questions or concerns at this time.

## 2020-11-20 NOTE — DISCHARGE INSTR - COC
Continuity of Care Form    Patient Name: Ada Butcher   :  1960  MRN:  [id number]    Admit date:  2020  Discharge date:  20  Code Status Order: Full Code   Advance Directives:   [facility] Documentation       Date/Time Healthcare Directive Type of Healthcare Directive Copy in [address] Agent's Name Healthcare Agent's Phone Number    [phone]  Yes, patient has an advance directive for healthcare treatment  Living will  No, copy requested from family  --  --  --            Admitting Physician:  Richard Leger MD  PCP: Syd Padgett DO    Discharging Nurse: [facility] Unit/Room#: 6A-08/008-A  Discharging Unit Phone Number: [phone]    Emergency Contact:   Extended Emergency Contact Information  Primary Emergency Contact: Ramiro Davidson  Address: [address] [place] [address] [facility] [address] [place] [address] Phone: [phone]  Relation: Spouse  Secondary Emergency Contact: Carlotta Gonzales [address] Phone: [phone]  Relation: Child    Past Surgical History:  Past Surgical History:   Procedure Laterality Date    BACK SURGERY      spine is fused due to scoliosis, since  has had 4 other surgeries.  HYSTERECTOMY      LUMBAR DISC SURGERY      TONSILLECTOMY      at [de-identified] years old       Immunization History: There is no immunization history on file for this patient.     Active Problems:  Patient Active Problem List   Diagnosis Code    COVID-19 U07.1    Type II or unspecified type diabetes mellitus without mention of complication, not stated as uncontrolled E11.9    CTEPH (chronic thromboembolic pulmonary hypertension) ([facility]) I27.24    Hypoxemia R09.02       Isolation/Infection:   Isolation            Droplet Plus          Patient Infection Status       Infection Onset Added Last Indicated Last Indicated By Review Planned Expiration Resolved Resolved By    COVID-19 11/14/20 11/14/20 75 Kash Duffy RN 11/27/20 11/28/20      S/S 11/7, +11/10, admit 11//13, severe            Nurse Assessment:  Last Vital Signs: BP (!) 130/56   Pulse 78   Temp 98.4 °F (36.9 °C) (Oral)   Resp 23   Wt 275 lb 9.6 oz (125 kg)   SpO2 95%   BMI 47.31 kg/m²     Last documented pain score (0-10 scale): Pain Level: 0  Last Weight:   Wt Readings from Last 1 Encounters:   11/20/20 275 lb 9.6 oz (125 kg)     Mental Status:  oriented and alert    IV Access:  Left hand access    Nursing Mobility/ADLs:  Walking   Assisted  Transfer  Assisted  Bathing  Assisted  Dressing  Assisted  Toileting  Assisted  Feeding  Independent  Med Admin  Independent  Med Delivery   whole    Wound Care Documentation and Therapy:        Elimination:  Continence:   · Bowel: Yes  · Bladder: Yes  Urinary Catheter: None   Colostomy/Ileostomy/Ileal Conduit: No       Date of Last BM: 11-20-20    Intake/Output Summary (Last 24 hours) at 11/20/2020 1423  Last data filed at 11/20/2020 1008  Gross per 24 hour   Intake 2220.08 ml   Output 550 ml   Net 1670.08 ml     I/O last 3 completed shifts: In: 2460.1 [P.O.:1330; I.V.:1130.1]  Out: 2050 [Urine:2050]    Safety Concerns:     None    Impairments/Disabilities:      None    Nutrition Therapy:  Current Nutrition Therapy: Oral diet Carb control      Routes of Feeding: Oral  Liquids: No Restrictions  Daily Fluid Restriction: no  Last Modified Barium Swallow with Video (Video Swallowing Test): not done    Treatments at the Time of Hospital Discharge:   Respiratory Treatments: ***  Oxygen Therapy:  high flow  Ventilator:    - No ventilator support    Rehab Therapies: Physical Therapy and Occupational Therapy  Weight Bearing Status/Restrictions: No weight bearing restirctions  Other Medical Equipment (for information only, NOT a DME order):     Other Treatments: ***    Patient's personal belongings (please select all that are sent with patient):      RN SIGNATURE:  Roseanne DONAHUE    CASE MANAGEMENT/SOCIAL WORK SECTION    Inpatient Status Date: ***    Readmission Risk Assessment Score:  Readmission Risk              Risk of Unplanned Readmission:        17           Discharging to Facility/ Agency   · Name:   · Address:  · Phone:  · Fax:    Dialysis Facility (if applicable)   · Name:  · Address:  · Dialysis Schedule:  · Phone:  · Fax:    / signature: {Esignature:[id number]:::0}    PHYSICIAN SECTION    Prognosis: Fair    Condition at Discharge: Stable    Rehab Potential (if transferring to Rehab): Fair    Recommended Labs or Other Treatments After Discharge:     Physician Certification: I certify the above information and transfer of Sparkle Boyer  is necessary for the continuing treatment of the diagnosis listed and that she requires LTAC for less 30 days.      Update Admission H&P: No change in H&P    PHYSICIAN SIGNATURE:  Electronically signed by Ana Rosa Santoyo DO on 11/20/20 at 2:24 PM EST

## 2020-11-20 NOTE — CARE COORDINATION
[facility] Department of Health notified of pt's discharge to [facility], [facility].      Electronically signed by Bernard Alvarez RN on 11/20/2020 at 5:01 PM

## 2020-11-29 NOTE — DISCHARGE SUMMARY
Hospital Medicine Discharge Summary      Patient Identification:   Checo Bar   : 1960  MRN: [id number]   Account: [de-identified]      Patient's PCP: Joe Cash DO    Admit Date: 2020     Discharge Date: 2020      Admitting Physician: Moustapha Rose MD     Discharge Physician: Maxine Rasmussen DO     Discharge Diagnoses: Active Hospital Problems    Diagnosis Date Noted    COVID-19 [U07.1] 2020    Type II or unspecified type diabetes mellitus without mention of complication, not stated as uncontrolled [E11.9]     CTEPH (chronic thromboembolic pulmonary hypertension) ([facility] [address] [I27.24]        The patient was seen and examined on day of discharge and this discharge summary is in conjunction with any daily progress note from day of discharge. Hospital Course:   Cheoc Bar is a 61 y.o. female admitted to [facility] on 2020 for shortness of breath, diarrhea, fever. 1. COVID-19 viral pneumonia: Continue with incentive spirometry vitamin C, D, zinc and Decadron and remdesivir  2. Acute on chronic hypoxic respiratory failure secondary to #1: History of pulmonary hypertension patient is on Adempas 3 times daily, high flow nasal cannula weaning as tolerated  3. Hyponatremia hypovolemic resolved  4. Diabetes mellitus type 2 with hyperglycemia secondary to Decadron will continue coverage via insulin pump  5. History of DVT patient on Eliquis     Events noted in the last 24 hours: Respiratory is gradually decreased her FiO2 patient currently on 40 with a flow rate of 35, patient being considered for possible transfer to [place].     2020: Patient has completed her 5 doses of remdesivir, she has been accepted to [place], will transfer to [place]     CC: Shortness of breath, diarrhea, fever     HPI: 27-year-old female presents to [facility] with shortness of breath, fever, diarrhea.   Patient has a past medical history of pulmonary by mouth every 8 hours as needed for Anxiety. Indications: Feeling Anxious             Riociguat (ADEMPAS) 2.5 MG TABS  Take 2.5 mg by mouth 3 times daily             rosuvastatin (CRESTOR) 10 MG tablet    Take 10 mg by mouth every evening              sitaGLIPtin (JANUVIA) 100 MG tablet    Take 100 mg by mouth every evening              topiramate (TOPAMAX) 100 MG tablet  Take 100 mg by mouth 2 times daily             treprostinil (REMODULIN) 200 MG/20ML SOLN infusion  Inject 4,290 ng/min into the skin continuous             vitamin B-12 (CYANOCOBALAMIN) 1000 MCG tablet  Take 1,000 mcg by mouth daily             Vitamin D (CHOLECALCIFEROL) 50 MCG (2000 UT) TABS tablet  Take 1 tablet by mouth daily             zinc sulfate (ZINCATE) 220 (50 Zn) MG capsule  Take 1 capsule by mouth daily                 Time Spent on discharge is more than 45 minutes in the examination, evaluation, counseling and review of medications and discharge plan. Signed: Thank you Ambrose Franco DO for the opportunity to be involved in this patient's care.     Electronically signed by Ade Houston DO on 11/29/2020 at 10:39 AM

## 2024-02-07 ENCOUNTER — OFFICE VISIT (OUTPATIENT)
Dept: OBGYN CLINIC | Age: 64
End: 2024-02-07
Payer: COMMERCIAL

## 2024-02-07 VITALS
SYSTOLIC BLOOD PRESSURE: 108 MMHG | WEIGHT: 226 LBS | BODY MASS INDEX: 38.58 KG/M2 | DIASTOLIC BLOOD PRESSURE: 54 MMHG | HEIGHT: 64 IN

## 2024-02-07 DIAGNOSIS — Z01.419 WOMEN'S ANNUAL ROUTINE GYNECOLOGICAL EXAMINATION: Primary | ICD-10-CM

## 2024-02-07 PROCEDURE — 99386 PREV VISIT NEW AGE 40-64: CPT | Performed by: OBSTETRICS & GYNECOLOGY

## 2024-02-07 PROCEDURE — G8484 FLU IMMUNIZE NO ADMIN: HCPCS | Performed by: OBSTETRICS & GYNECOLOGY

## 2024-02-07 RX ORDER — SEMAGLUTIDE 2.68 MG/ML
2 INJECTION, SOLUTION SUBCUTANEOUS
COMMUNITY

## 2024-02-07 RX ORDER — SPIRONOLACTONE 100 MG/1
2 TABLET, FILM COATED ORAL DAILY
COMMUNITY
Start: 2023-09-22

## 2024-02-07 RX ORDER — INSULIN PUMP CONTROLLER
EACH MISCELLANEOUS
COMMUNITY
Start: 2023-12-14

## 2024-02-07 RX ORDER — CETIRIZINE HYDROCHLORIDE 10 MG/1
10 TABLET ORAL
COMMUNITY

## 2024-02-07 RX ORDER — INSULIN PMP CART,AUT,G6/7,CNTR
EACH SUBCUTANEOUS
COMMUNITY
Start: 2024-01-12

## 2024-02-07 RX ORDER — METOLAZONE 2.5 MG/1
TABLET ORAL
COMMUNITY
Start: 2021-06-28

## 2024-02-07 RX ORDER — INSULIN HUMAN 500 [IU]/ML
INJECTION, SOLUTION SUBCUTANEOUS
COMMUNITY
Start: 2023-12-13

## 2024-02-07 RX ORDER — TORSEMIDE 100 MG/1
TABLET ORAL
COMMUNITY
Start: 2024-01-23

## 2024-02-07 RX ORDER — POTASSIUM CHLORIDE 750 MG/1
TABLET, FILM COATED, EXTENDED RELEASE ORAL
COMMUNITY
Start: 2023-11-07

## 2024-02-07 ASSESSMENT — ENCOUNTER SYMPTOMS
DIARRHEA: 0
SHORTNESS OF BREATH: 0
ABDOMINAL PAIN: 0
CONSTIPATION: 0

## 2024-02-07 NOTE — PROGRESS NOTES
YEARLY PHYSICAL    Date of service: 2024    Digna Trejo  Is a 63 y.o. female    PT's PCP is: Calixto Ferrer Jr.,      : 1960                                         Chaperone for Intimate Exam  Chaperone was offered as part of the rooming process. Patient declined and agrees to continue with exam without a chaperone.  Chaperone: N/A      Subjective:       No LMP recorded. Patient has had a hysterectomy.     Are your menses regular: not applicable    OB History    Para Term  AB Living   2 2           SAB IAB Ectopic Molar Multiple Live Births             2      # Outcome Date GA Lbr Tarik/2nd Weight Sex Delivery Anes PTL Lv   2 Para            1 Para                 Social History     Tobacco Use   Smoking Status Former    Current packs/day: 0.00    Average packs/day: 1 pack/day for 30.0 years (30.0 ttl pk-yrs)    Types: Cigarettes    Start date: 1985    Quit date: 2015    Years since quittin.2   Smokeless Tobacco Never        Social History     Substance and Sexual Activity   Alcohol Use Yes    Alcohol/week: 2.0 standard drinks of alcohol    Types: 2 Glasses of wine per week       Family History   Problem Relation Age of Onset    Diabetes Mother     Other Mother     Heart Disease Father     Diabetes Father     Other Father     Arthritis Sister     Other Other        Any family history of breast or ovarian cancer: No    Any family history of blood clots: No      Allergies: Latex; Beta adrenergic blockers; Sildenafil; Dust mite extract; Nitrates, organic; and Adhesive tape      Current Outpatient Medications:     cetirizine (ZYRTEC) 10 MG tablet, Take 1 tablet by mouth, Disp: , Rfl:     Ferrous Sulfate (IRON PO), Take 1 tablet by mouth daily, Disp: , Rfl:     Insulin Disposable Pump (OMNIPOD 5 G6 INTRO, GEN 5,) KIT, , Disp: , Rfl:     Insulin Disposable Pump (OMNIPOD DASH PODS, GEN 4,) MISC, , Disp: ,

## 2024-11-27 LAB — MAMMOGRAPHY, EXTERNAL: NORMAL

## 2025-04-21 ENCOUNTER — OFFICE VISIT (OUTPATIENT)
Dept: OBGYN CLINIC | Age: 65
End: 2025-04-21
Payer: MEDICARE

## 2025-04-21 VITALS
WEIGHT: 231 LBS | SYSTOLIC BLOOD PRESSURE: 114 MMHG | DIASTOLIC BLOOD PRESSURE: 60 MMHG | BODY MASS INDEX: 39.44 KG/M2 | HEIGHT: 64 IN

## 2025-04-21 DIAGNOSIS — Z01.419 VISIT FOR GYNECOLOGIC EXAMINATION: Primary | ICD-10-CM

## 2025-04-21 DIAGNOSIS — N39.3 STRESS INCONTINENCE: ICD-10-CM

## 2025-04-21 PROCEDURE — G0101 CA SCREEN;PELVIC/BREAST EXAM: HCPCS | Performed by: ADVANCED PRACTICE MIDWIFE

## 2025-04-21 RX ORDER — TIRZEPATIDE 10 MG/.5ML
INJECTION, SOLUTION SUBCUTANEOUS
COMMUNITY
Start: 2025-03-28

## 2025-04-21 NOTE — PROGRESS NOTES
Chaperone for Intimate Exam  Chaperone was offered as part of the rooming process. Patient declined and agrees to continue with exam without a chaperone.       
11/2023    Do you do self breast exams: Encouraged    Past Medical History:   Diagnosis Date    Arthritis     per pt    CHF (congestive heart failure) (HCC)     per pt    Depression     Hx of blood clots     per pt    Pulmonary hypertension (HCC)     Type 2 diabetes mellitus without complication (HCC)     Type II or unspecified type diabetes mellitus without mention of complication, not stated as uncontrolled     per pt       Past Surgical History:   Procedure Laterality Date    BACK SURGERY  01/01/1980    spine is fused due to scoliosis, since 1980 has had 4 other surgeries.    LUMBAR DISC SURGERY  01/01/1990    OTHER SURGICAL HISTORY      pulmonary angioplasty x7    PARTIAL HYSTERECTOMY (CERVIX NOT REMOVED)  2004    TONSILLECTOMY      at [age] years old       Family History   Problem Relation Age of Onset    Diabetes Mother     Other Mother     Heart Disease Father     Diabetes Father     Other Father     Arthritis Sister     Other Other        No chief complaint on file.       Labs:    No results found for this visit on 04/21/25.      HPI:  Annual.  Here today with spouse who assists her from wheelchair and with dressing.  Previously seen by Dr Rivers. Denies breast/pelvic concerns.  Sexually active - no longer.  Pap smear - none - no history of CIN2+, is status post hysterectomy - per previous review noted had hyst with uterus and left ovary removed but last year annual also noted cervix absent.  Patient reports had total hysterectomy including both ovaries in 2004.  Mammogram UTD.  PCP for wellness.  Colon screening 2023.  Dexa 2010    Review of Systems   Constitutional: Negative.  Negative for chills, fatigue and fever.   HENT: Negative.     Respiratory: Negative.  Negative for shortness of breath.    Cardiovascular: Negative.  Negative for chest pain.   Gastrointestinal: Negative.  Negative for abdominal pain, constipation and diarrhea.   Genitourinary:  Negative for dysuria, enuresis, frequency, menstrual